# Patient Record
Sex: FEMALE | Race: WHITE | NOT HISPANIC OR LATINO | Employment: STUDENT | URBAN - METROPOLITAN AREA
[De-identification: names, ages, dates, MRNs, and addresses within clinical notes are randomized per-mention and may not be internally consistent; named-entity substitution may affect disease eponyms.]

---
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
## 2022-01-12 DIAGNOSIS — N92.0 MENORRHAGIA WITH REGULAR CYCLE: ICD-10-CM

## 2022-01-12 DIAGNOSIS — N94.6 DYSMENORRHEA: ICD-10-CM

## 2022-01-12 RX ORDER — NORETHINDRONE ACETATE/ETHINYL ESTRADIOL AND FERROUS FUMARATE 1MG-20(24)
1 KIT ORAL DAILY
Qty: 28 TABLET | Refills: 1 | Status: SHIPPED | OUTPATIENT
Start: 2022-01-12 | End: 2022-02-28 | Stop reason: SDUPTHER

## 2022-02-28 ENCOUNTER — ANNUAL EXAM (OUTPATIENT)
Dept: OBGYN CLINIC | Facility: CLINIC | Age: 22
End: 2022-02-28
Payer: COMMERCIAL

## 2022-02-28 VITALS — BODY MASS INDEX: 26.13 KG/M2 | WEIGHT: 157 LBS

## 2022-02-28 DIAGNOSIS — N92.0 MENORRHAGIA WITH REGULAR CYCLE: ICD-10-CM

## 2022-02-28 DIAGNOSIS — R10.2 PELVIC PAIN IN FEMALE: ICD-10-CM

## 2022-02-28 DIAGNOSIS — Z01.419 ENCNTR FOR GYN EXAM (GENERAL) (ROUTINE) W/O ABN FINDINGS: Primary | ICD-10-CM

## 2022-02-28 DIAGNOSIS — N94.6 DYSMENORRHEA: ICD-10-CM

## 2022-02-28 PROCEDURE — 99395 PREV VISIT EST AGE 18-39: CPT | Performed by: NURSE PRACTITIONER

## 2022-02-28 RX ORDER — OMEPRAZOLE 40 MG/1
40 CAPSULE, DELAYED RELEASE ORAL DAILY
COMMUNITY
Start: 2021-12-21 | End: 2022-03-21

## 2022-02-28 RX ORDER — NORETHINDRONE ACETATE/ETHINYL ESTRADIOL AND FERROUS FUMARATE 1MG-20(24)
1 KIT ORAL DAILY
Qty: 84 TABLET | Refills: 3 | Status: SHIPPED | OUTPATIENT
Start: 2022-02-28

## 2022-02-28 NOTE — PROGRESS NOTES
Assessment/Plan   Diagnoses and all orders for this visit:    Encntr for gyn exam (general) (routine) w/o abn findings    Menorrhagia with regular cycle  -     norethindrone-ethinyl estradiol-ferrous fumarate (Reece 24 FE) 1-20 MG-MCG(24) per tablet; Take 1 tablet by mouth daily    Pelvic pain in female  -     US pelvis transabdominal only; Future    Dysmenorrhea  -     norethindrone-ethinyl estradiol-ferrous fumarate (Reece 24 FE) 1-20 MG-MCG(24) per tablet; Take 1 tablet by mouth daily    Other orders  -     omeprazole (PriLOSEC) 40 MG capsule; Take 40 mg by mouth daily        Discussion    Reviewed with patient normal exam today  Pelvic exam deferred  Recommend pt monitor when pain is occurring in association with menses  Recommend charting on a calendar as well as noting when and where its occurring to try and determine causation if any  Can take Ibuprofen 600mg Q 6 hours as needed for pain  If continues or worens can get pelvic ultrasound (transabdominal) due to virginal    Discussed continuous OCP to see if will help with discomfort  Reviewed monthly SBEs  Encourage safe sexual practices; STD testing done today  Contraception - OCP Reece 24 Fe  Pap smears will start at age 26 per the ASCCP guidelines  All questions have been answered to her satisfaction  RTO for annul or sooner if needed    Subjective     Leonela Amaya is a 25 y o  female who presents for annual well woman exam    Last exam 11/25/2020  Pap guidelines reviewed with patient  Pap deferred today  Pt denies any abnormal vaginal discharge, itching, or odor  Pt virginal and denies the need for STD testing today    Menstrual Cycle:  LMP: 2/14/2022  Period Cycle (Days): 28  Period Duration (Days): 5  Period Pattern: Regular  Menstrual Flow: Moderate,Heavy  Menstrual Control: Tampon  Menstrual Control Change Freq (Hours): 6  Dysmenorrhea: (!) Mild  Dysmenorrhea Symptoms: Cramping  Pt complaining of cramping from her clitoris to her anus, feels like the whole muscle cramps  When bad they will be from lower pelvic region  Unable to note a pattern  Originally was only during her menses, now happening more often  When they occur will last a minute or so  Feels like everything is tightening and twisting  Still occur with menses  7/10 pain  She will stay still for a minute and the pain will go away  Usually occur when she is using the restroom  When having a bowel movement  Will occur about 3-4 x a month  Sometimes will occur frequently when menses is heavier  OB History     G 0   Contraception: OCP Reece Fe working well would like to continue  Practices monthly SBEs, no breast complaints today  Denies any bowel or bladder issues  Pt follows with PCP for regular check-ups and blood work  Did have Gardasil vaccine series  Review of Systems   All other systems reviewed and are negative  The following portions of the patient's history were reviewed and updated as appropriate: allergies, current medications, past family history, past medical history, past social history, past surgical history and problem list     History reviewed  No pertinent past medical history  History reviewed  No pertinent surgical history  History reviewed  No pertinent family history      Social History     Socioeconomic History    Marital status: Single     Spouse name: Not on file    Number of children: Not on file    Years of education: Not on file    Highest education level: Not on file   Occupational History    Not on file   Tobacco Use    Smoking status: Never Smoker    Smokeless tobacco: Never Used   Vaping Use    Vaping Use: Never used   Substance and Sexual Activity    Alcohol use: Never    Drug use: Never    Sexual activity: Never   Other Topics Concern    Not on file   Social History Narrative    Not on file     Social Determinants of Health     Financial Resource Strain: Not on file   Food Insecurity: Not on file Transportation Needs: Not on file   Physical Activity: Not on file   Stress: Not on file   Social Connections: Not on file   Intimate Partner Violence: Not on file   Housing Stability: Not on file         Current Outpatient Medications:     erythromycin base 250 mg tablet, , Disp: , Rfl:     norethindrone-ethinyl estradiol-ferrous fumarate (Reece 24 FE) 1-20 MG-MCG(24) per tablet, Take 1 tablet by mouth daily, Disp: 84 tablet, Rfl: 3    omeprazole (PriLOSEC) 40 MG capsule, Take 40 mg by mouth daily, Disp: , Rfl:     Adapalene 0 3 % gel, , Disp: , Rfl:     Cholecalciferol 2000 units CAPS, Take 2,000 Units by mouth daily  (Patient not taking: Reported on 2/28/2022 ), Disp: , Rfl:     dicyclomine (BENTYL) 10 mg capsule, Take 10 mg by mouth 4 (four) times a day (before meals and at bedtime) (Patient not taking: Reported on 2/28/2022 ), Disp: , Rfl:     Allergies   Allergen Reactions    Apple - Food Allergy Swelling    Cats Claw (Uncaria Tomentosa) Eye Swelling    Other      Chemical Suncreens    Avobenzone Rash    Oxybenzone Rash       Objective   Vitals:    02/28/22 1013   Weight: 71 2 kg (157 lb)     Physical Exam  Vitals and nursing note reviewed  Constitutional:       Appearance: She is well-developed  HENT:      Head: Normocephalic  Neck:      Thyroid: No thyromegaly  Trachea: No tracheal deviation  Cardiovascular:      Rate and Rhythm: Normal rate and regular rhythm  Heart sounds: Normal heart sounds  Pulmonary:      Effort: Pulmonary effort is normal       Breath sounds: Normal breath sounds  Chest:   Breasts: Breasts are symmetrical       Right: No inverted nipple, mass, nipple discharge, skin change or tenderness  Left: No inverted nipple, mass, nipple discharge, skin change or tenderness  Abdominal:      General: Bowel sounds are normal  There is no distension  Palpations: Abdomen is soft  There is no mass  Tenderness: There is no abdominal tenderness  There is no guarding or rebound  Musculoskeletal:         General: Normal range of motion  Cervical back: Normal range of motion and neck supple  Skin:     General: Skin is warm and dry  Neurological:      Mental Status: She is alert and oriented to person, place, and time  Psychiatric:         Behavior: Behavior normal          Thought Content:  Thought content normal          Judgment: Judgment normal

## 2022-03-17 ENCOUNTER — HOSPITAL ENCOUNTER (OUTPATIENT)
Dept: RADIOLOGY | Facility: HOSPITAL | Age: 22
Discharge: HOME | End: 2022-03-17
Payer: COMMERCIAL

## 2022-03-17 DIAGNOSIS — R10.2 PELVIC PAIN IN FEMALE: ICD-10-CM

## 2022-03-17 PROCEDURE — 76856 US EXAM PELVIC COMPLETE: CPT

## 2022-04-15 ENCOUNTER — RA CDI HCC (OUTPATIENT)
Dept: OTHER | Facility: HOSPITAL | Age: 22
End: 2022-04-15

## 2022-04-15 NOTE — PROGRESS NOTES
[facility]  coding opportunities       Chart reviewed, no opportunity found: CHART REVIEWED, NO OPPORTUNITY FOUND        Patients Insurance        Commercial Insurance: Shelton Saha

## 2022-04-18 NOTE — PROGRESS NOTES
FAMILY PRACTICE HEALTH MAINTENANCE OFFICE VISIT  [facility]    NAME: Mee Cassidy  AGE: 25 y o  SEX: female  : 2000     DATE: 2022    Assessment and Plan     There are no diagnoses linked to this encounter  Patient Counseling:   {LK Adult CPE Counseling MVVZ:05950::"KUPTZTUIN: Stressed importance of a well balanced diet, moderation of sodium/saturated fat, caloric balance and sufficient intake of fiber","Exercise: Stressed the importance of regular exercise with a goal of 150 minutes per week","Dental Health: Discussed daily flossing and brushing and regular dental visits "}    Immunizations reviewed: { immunization CPE list:70504}  Discussed benefits of:  {LK Adult CPE Screening counselin}  BMI Counseling: There is no height or weight on file to calculate BMI  Discussed with patient's BMI with her  The BMI {VB BMI Counselin}    No follow-ups on file  Chief Complaint   No chief complaint on file  History of Present Illness     HPI    Well Adult Physical   Patient here for a comprehensive physical exam       Diet and Physical Activity  Diet: {diet; well adult:47258}  Exercise: {exericse; well adult:23717}      Depression Screen  PHQ-2/9 Depression Screening            General Health  Hearing: {WELL ADULT BANYWSE:44488}  Vision: {vision; well adult:56023}  Dental: {dental; well adult:14405}    Reproductive Health  { Adult CPE Screening counselin}      The following portions of the patient's history were reviewed and updated as appropriate: allergies, current medications, past family history, past medical history, past social history, past surgical history and problem list     Review of Systems     Review of Systems   Constitutional: Negative  HENT: Negative  Eyes: Negative  Respiratory: Negative  Cardiovascular: Negative  Gastrointestinal: Negative  Endocrine: Negative  Genitourinary: Negative  Musculoskeletal: Negative  Skin: Negative  Allergic/Immunologic: Negative  Neurological: Negative  Hematological: Negative  Psychiatric/Behavioral: Negative  Past Medical History     No past medical history on file  Past Surgical History     No past surgical history on file  Social History     Social History     Socioeconomic History    Marital status: Single     Spouse name: Not on file    Number of children: Not on file    Years of education: Not on file    Highest education level: Not on file   Occupational History    Not on file   Tobacco Use    Smoking status: Never Smoker    Smokeless tobacco: Never Used   Vaping Use    Vaping Use: Never used   Substance and Sexual Activity    Alcohol use: Never    Drug use: Never    Sexual activity: Never   Other Topics Concern    Not on file   Social History Narrative    Not on file     Social Determinants of Health     Financial Resource Strain: Not on file   Food Insecurity: Not on file   Transportation Needs: Not on file   Physical Activity: Not on file   Stress: Not on file   Social Connections: Not on file   Intimate Partner Violence: Not on file   Housing Stability: Not on file       Family History     No family history on file      Current Medications       Current Outpatient Medications:     Adapalene 0 3 % gel, , Disp: , Rfl:     Cholecalciferol 2000 units CAPS, Take 2,000 Units by mouth daily  (Patient not taking: Reported on 2/28/2022 ), Disp: , Rfl:     dicyclomine (BENTYL) 10 mg capsule, Take 10 mg by mouth 4 (four) times a day (before meals and at bedtime) (Patient not taking: Reported on 2/28/2022 ), Disp: , Rfl:     erythromycin base 250 mg tablet, , Disp: , Rfl:     norethindrone-ethinyl estradiol-ferrous fumarate (Reece 24 FE) 1-20 MG-MCG(24) per tablet, Take 1 tablet by mouth daily, Disp: 84 tablet, Rfl: 3     Allergies     Allergies   Allergen Reactions    Apple - Food Allergy Swelling    Cats Claw (Uncaria Tomentosa) Eye Swelling    Other      Chemical Suncreens    Avobenzone Rash    Oxybenzone Rash       Objective     There were no vitals taken for this visit       Physical Exam      No exam data present        MD ELROY [facility]

## 2022-04-25 ENCOUNTER — OFFICE VISIT (OUTPATIENT)
Dept: FAMILY MEDICINE CLINIC | Facility: CLINIC | Age: 22
End: 2022-04-25
Payer: COMMERCIAL

## 2022-04-25 VITALS
TEMPERATURE: 97.5 F | DIASTOLIC BLOOD PRESSURE: 60 MMHG | BODY MASS INDEX: 26.82 KG/M2 | SYSTOLIC BLOOD PRESSURE: 110 MMHG | RESPIRATION RATE: 16 BRPM | HEART RATE: 88 BPM | WEIGHT: 161 LBS | OXYGEN SATURATION: 96 % | HEIGHT: 65 IN

## 2022-04-25 DIAGNOSIS — G43.709 CHRONIC MIGRAINE WITHOUT AURA WITHOUT STATUS MIGRAINOSUS, NOT INTRACTABLE: Primary | ICD-10-CM

## 2022-04-25 DIAGNOSIS — K58.2 IRRITABLE BOWEL SYNDROME WITH BOTH CONSTIPATION AND DIARRHEA: ICD-10-CM

## 2022-04-25 PROCEDURE — 3008F BODY MASS INDEX DOCD: CPT | Performed by: INTERNAL MEDICINE

## 2022-04-25 PROCEDURE — 1036F TOBACCO NON-USER: CPT | Performed by: INTERNAL MEDICINE

## 2022-04-25 PROCEDURE — 99213 OFFICE O/P EST LOW 20 MIN: CPT | Performed by: INTERNAL MEDICINE

## 2022-04-25 RX ORDER — DICYCLOMINE HYDROCHLORIDE 10 MG/1
10 CAPSULE ORAL
Qty: 60 CAPSULE | Refills: 1 | Status: SHIPPED | OUTPATIENT
Start: 2022-04-25

## 2022-04-25 RX ORDER — OMEPRAZOLE 40 MG/1
40 CAPSULE, DELAYED RELEASE ORAL
COMMUNITY
Start: 2022-03-28

## 2022-04-25 RX ORDER — RIZATRIPTAN BENZOATE 10 MG/1
10 TABLET, ORALLY DISINTEGRATING ORAL ONCE AS NEEDED
Qty: 9 TABLET | Refills: 3 | Status: SHIPPED | OUTPATIENT
Start: 2022-04-25

## 2022-04-25 NOTE — ASSESSMENT & PLAN NOTE
We discussed how OCP's may be contributing, although she has no aura and is on a low dose OCP  If things worsen or she gets an aura she may have to stop OCP's  She will keep a migraine diary and will try maxalt PRN; does not have frequent enough migraines to want a prophylactic medication  Asked patient to call sooner than next scheduled appointment for any complaints or issues

## 2022-04-25 NOTE — PROGRESS NOTES
Assessment/Plan:    1  Chronic migraine without aura without status migrainosus, not intractable  Assessment & Plan:  We discussed how OCP's may be contributing, although she has no aura and is on a low dose OCP  If things worsen or she gets an aura she may have to stop OCP's  She will keep a migraine diary and will try maxalt PRN; does not have frequent enough migraines to want a prophylactic medication  Asked patient to call sooner than next scheduled appointment for any complaints or issues  Orders:  -     rizatriptan (Maxalt-MLT) 10 MG disintegrating tablet; Take 1 tablet (10 mg total) by mouth once as needed for migraine for up to 1 dose May repeat in 2 hours if needed    2  Irritable bowel syndrome with both constipation and diarrhea  -     dicyclomine (BENTYL) 10 mg capsule; Take 1 capsule (10 mg total) by mouth 4 (four) times a day (before meals and at bedtime)            There are no Patient Instructions on file for this visit  Return if symptoms worsen or fail to improve  Subjective:      Patient ID: Antonia Fraser is a 25 y o  female  Chief Complaint   Patient presents with    Migraine     started 1 year ago progressivly getting worse-wmcma       She started with migraines about a year ago  She had one in March, then one every month over the winter months, then one in February  Not associated with her period  Has been on OCP's for about 4 years or more  Does have light sensitivity and nausea  No sound sensitivity  Advil or tylenol do not give relief  Dim lights and cooler room will help  There are no other neurologic symptoms  The following portions of the patient's history were reviewed and updated as appropriate: allergies, current medications, past family history, past medical history, past social history, past surgical history and problem list     Review of Systems   Constitutional: Negative  Respiratory: Negative  Cardiovascular: Negative      Neurological: Positive for headaches  Current Outpatient Medications   Medication Sig Dispense Refill    dicyclomine (BENTYL) 10 mg capsule Take 1 capsule (10 mg total) by mouth 4 (four) times a day (before meals and at bedtime) 60 capsule 1    erythromycin base 250 mg tablet       norethindrone-ethinyl estradiol-ferrous fumarate (Reece 24 FE) 1-20 MG-MCG(24) per tablet Take 1 tablet by mouth daily 84 tablet 3    omeprazole (PriLOSEC) 40 MG capsule Take 40 mg by mouth daily in the early morning      rizatriptan (Maxalt-MLT) 10 MG disintegrating tablet Take 1 tablet (10 mg total) by mouth once as needed for migraine for up to 1 dose May repeat in 2 hours if needed 9 tablet 3     No current facility-administered medications for this visit  Objective:    /60   Pulse 88   Temp 97 5 °F (36 4 °C)   Resp 16   Ht 5' 5" (1 651 m)   Wt 73 kg (161 lb)   SpO2 96%   BMI 26 79 kg/m²        Physical Exam  Constitutional:       Appearance: She is well-developed  Eyes:      Conjunctiva/sclera: Conjunctivae normal    Neck:      Thyroid: No thyromegaly  Vascular: No JVD  Cardiovascular:      Rate and Rhythm: Normal rate and regular rhythm  Heart sounds: Normal heart sounds  No murmur heard  No friction rub  No gallop  Pulmonary:      Effort: Pulmonary effort is normal       Breath sounds: Normal breath sounds  No wheezing or rales  Abdominal:      General: Bowel sounds are normal  There is no distension  Palpations: Abdomen is soft  Tenderness: There is no abdominal tenderness  Musculoskeletal:      Cervical back: Neck supple  Neurological:      General: No focal deficit present  Mental Status: She is oriented to person, place, and time  Cranial Nerves: No cranial nerve deficit  Motor: No weakness        Coordination: Coordination normal       Gait: Gait normal       Deep Tendon Reflexes: Reflexes normal                 Eduardo Solis MD

## 2022-11-11 ENCOUNTER — NEW PATIENT (OUTPATIENT)
Dept: URBAN - METROPOLITAN AREA CLINIC 6 | Facility: CLINIC | Age: 22
End: 2022-11-11

## 2022-11-11 DIAGNOSIS — G43.109: ICD-10-CM

## 2022-11-11 DIAGNOSIS — Z83.511: ICD-10-CM

## 2022-11-11 PROCEDURE — 92004 COMPRE OPH EXAM NEW PT 1/>: CPT

## 2022-11-11 ASSESSMENT — TONOMETRY
OD_IOP_MMHG: 16
OS_IOP_MMHG: 14

## 2022-11-11 ASSESSMENT — VISUAL ACUITY
OD_CC: 20/25
OS_CC: 20/30

## 2022-12-21 ENCOUNTER — CONSULT (OUTPATIENT)
Dept: GASTROENTEROLOGY | Facility: AMBULARY SURGERY CENTER | Age: 22
End: 2022-12-21

## 2022-12-21 VITALS
BODY MASS INDEX: 25.33 KG/M2 | DIASTOLIC BLOOD PRESSURE: 62 MMHG | SYSTOLIC BLOOD PRESSURE: 112 MMHG | OXYGEN SATURATION: 99 % | HEART RATE: 93 BPM | WEIGHT: 152 LBS | HEIGHT: 65 IN

## 2022-12-21 DIAGNOSIS — K59.09 OTHER CONSTIPATION: ICD-10-CM

## 2022-12-21 DIAGNOSIS — K31.84 GASTROPARESIS: ICD-10-CM

## 2022-12-21 DIAGNOSIS — K21.9 GASTROESOPHAGEAL REFLUX DISEASE WITHOUT ESOPHAGITIS: Primary | ICD-10-CM

## 2022-12-21 RX ORDER — FAMOTIDINE 20 MG/1
20 TABLET, FILM COATED ORAL 2 TIMES DAILY
Qty: 60 TABLET | Refills: 11 | Status: SHIPPED | OUTPATIENT
Start: 2022-12-21

## 2022-12-21 RX ORDER — OMEPRAZOLE 40 MG/1
40 CAPSULE, DELAYED RELEASE ORAL
Qty: 30 CAPSULE | Refills: 11 | Status: SHIPPED | OUTPATIENT
Start: 2022-12-21

## 2022-12-21 RX ORDER — ERYTHROMYCIN 250 MG/1
250 TABLET, COATED ORAL 2 TIMES DAILY
Qty: 60 TABLET | Refills: 3 | Status: SHIPPED | OUTPATIENT
Start: 2022-12-21 | End: 2023-01-20

## 2022-12-21 NOTE — ASSESSMENT & PLAN NOTE
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

## 2022-12-21 NOTE — ASSESSMENT & PLAN NOTE
Patient with history of gastroparesis and has been on erythromycin 250 mg twice daily since 2016  She was evaluated by Dr Steff Rene at [facility] and was recommended for G-POEM    -had a long discussion with patient about the medications Reglan and domperidone but concerned about the side effects     -I would like to check the gastric emptying study prior to changing her to the new medication  Advised her to stop the erythromycin for couple of weeks and get the gastric emptying study    -advised about small frequent low-fat meal    -Follow up with Dr Steff Rene for G-POEM    -follow-up office visit after gastric emptying study  -we can also consider Kaylah Gear since she also reports having issues with constipation

## 2022-12-21 NOTE — PROGRESS NOTES
Consultation - [facility]  Ezequiel Vargas 2000 female         Chief Complaint:  History of gastroparesis    HPI:  28-year-old female with history of history bases, GERD came in since she is trying to transfer him from ileitis to adult GI  She was recently followed by GI in it [address] [place] [address] and had EGD with FLIP pyloric dilation by Dr Lindsey Covarrubias  She was advised for G-POEM but she is not sure if she wants to go through that  She has been on erythromycin 250 mg twice daily since 2016 when she was diagnosed with gastroparesis  Complaining about symptoms when she tries to stop the medication  She also takes omeprazole 40 mg daily  She reports having problems with some constipation  Denies any blood or mucus in the stool  Good appetite, no recent weight loss  Medical Resident: Dr Valentina Villafuerte: Review of Systems   Constitutional: Negative for activity change, appetite change, chills, diaphoresis, fatigue, fever and unexpected weight change  HENT: Negative for ear discharge, ear pain, facial swelling, hearing loss, nosebleeds, sore throat, tinnitus and voice change  Eyes: Negative for pain, discharge, redness, itching and visual disturbance  Respiratory: Negative for apnea, cough, chest tightness, shortness of breath and wheezing  Cardiovascular: Negative for chest pain and palpitations  Gastrointestinal:        As noted in HPI   Endocrine: Negative for cold intolerance, heat intolerance and polyuria  Genitourinary: Negative for difficulty urinating, dysuria, flank pain, hematuria and urgency  Musculoskeletal: Negative for arthralgias, back pain, gait problem, joint swelling and myalgias  Skin: Negative for rash and wound  Neurological: Negative for dizziness, tremors, seizures, speech difficulty, light-headedness, numbness and headaches  Hematological: Negative for adenopathy  Does not bruise/bleed easily  Psychiatric/Behavioral: Negative for agitation, behavioral problems and confusion  The patient is not nervous/anxious  No past medical history on file  No past surgical history on file  Social History     Socioeconomic History   • Marital status: Single     Spouse name: Not on file   • Number of children: Not on file   • Years of education: Not on file   • Highest education level: Not on file   Occupational History   • Not on file   Tobacco Use   • Smoking status: Never   • Smokeless tobacco: Never   Vaping Use   • Vaping Use: Never used   Substance and Sexual Activity   • Alcohol use: Never   • Drug use: Never   • Sexual activity: Never   Other Topics Concern   • Not on file   Social History Narrative   • Not on file     Social Determinants of Health     Financial Resource Strain: Not on file   Food Insecurity: Not on file   Transportation Needs: Not on file   Physical Activity: Not on file   Stress: Not on file   Social Connections: Not on file   Intimate Partner Violence: Not on file   Housing Stability: Not on file     No family history on file    Apple - food allergy, Cats claw (uncaria tomentosa), Other, Avobenzone, and Oxybenzone  Current Outpatient Medications   Medication Sig Dispense Refill   • dicyclomine (BENTYL) 10 mg capsule Take 1 capsule (10 mg total) by mouth 4 (four) times a day (before meals and at bedtime) 60 capsule 1   • erythromycin base 250 mg tablet Take 1 tablet (250 mg total) by mouth 2 (two) times a day 60 tablet 3   • famotidine (PEPCID) 20 mg tablet Take 1 tablet (20 mg total) by mouth 2 (two) times a day 60 tablet 11   • omeprazole (PriLOSEC) 40 MG capsule Take 1 capsule (40 mg total) by mouth daily in the early morning 30 capsule 11   • rizatriptan (Maxalt-MLT) 10 MG disintegrating tablet Take 1 tablet (10 mg total) by mouth once as needed for migraine for up to 1 dose May repeat in 2 hours if needed 9 tablet 3   • norethindrone-ethinyl estradiol-ferrous fumarate (Reece 24 FE) 1-20 MG-MCG(24) per tablet Take 1 tablet by mouth daily (Patient not taking: Reported on 12/21/2022) 84 tablet 3   • omeprazole (PriLOSEC) 40 MG capsule Take 40 mg by mouth daily       No current facility-administered medications for this visit  Blood pressure 112/62, pulse 93, height 5' 5" (1 651 m), weight 68 9 kg (152 lb), SpO2 99 %, not currently breastfeeding  PHYSICAL EXAM: Physical Exam  Constitutional:       Appearance: Normal appearance  She is well-developed  HENT:      Head: Normocephalic and atraumatic  Nose: Nose normal    Eyes:      Conjunctiva/sclera: Conjunctivae normal    Neck:      Thyroid: No thyromegaly  Vascular: No JVD  Trachea: No tracheal deviation  Cardiovascular:      Rate and Rhythm: Normal rate and regular rhythm  Heart sounds: Normal heart sounds  No murmur heard  No friction rub  No gallop  Pulmonary:      Effort: Pulmonary effort is normal  No respiratory distress  Breath sounds: Normal breath sounds  No wheezing or rales  Abdominal:      General: Bowel sounds are normal  There is no distension  Palpations: Abdomen is soft  There is no mass  Tenderness: There is no abdominal tenderness  There is no guarding  Hernia: No hernia is present  Musculoskeletal:         General: No tenderness or deformity  Cervical back: Neck supple  Right lower leg: No edema  Left lower leg: No edema  Lymphadenopathy:      Cervical: No cervical adenopathy  Skin:     General: Skin is warm and dry  Findings: No erythema or rash  Neurological:      Mental Status: She is alert and oriented to person, place, and time  Psychiatric:         Mood and Affect: Mood normal          Behavior: Behavior normal          Thought Content:  Thought content normal           Lab Results   Component Value Date    WBC 6 0 09/01/2018    HGB 13 8 09/01/2018    HCT 40 7 09/01/2018    MCV 90 2 09/01/2018     09/01/2018     No results found for: GLUCOSE, CALCIUM, NA, K, CO2, CL, BUN, CREATININE  No results found for: ALT, AST, GGT, ALKPHOS, BILITOT  No results found for: INR, PROTIME    US pelvis transabdominal only    Result Date: 3/18/2022  Impression:  Normal   Workstation performed: [id number]       ASSESSMENT & PLAN:    Gastroparesis  Patient with history of gastroparesis and has been on erythromycin 250 mg twice daily since 2016  She was evaluated by Dr Ly Palencia at [facility] and was recommended for G-POEM    -had a long discussion with patient about the medications Reglan and domperidone but concerned about the side effects     -I would like to check the gastric emptying study prior to changing her to the new medication  Advised her to stop the erythromycin for couple of weeks and get the gastric emptying study    -advised about small frequent low-fat meal    -Follow up with Dr Ly Palencia for G-POEM    -follow-up office visit after gastric emptying study  -we can also consider Jenelle Pineda since she also reports having issues with constipation  Gastroesophageal reflux disease without esophagitis  Patient with history of chronic GERD and just had upper endoscopy recently  No evidence of any Delgado's esophagus     -Patient was explained about the lifestyle and dietary modifications  Advised to avoid fatty foods, chocolates, caffeine, alcohol and any other triggering foods  Avoid eating for at least 3 hours before going to bed         -discussed about the long-term side effects from omeprazole and advised her to try Pepcid instead  Prescription was also sent for Pepcid      Other constipation  Patient reports having more issues with constipation which appear to be secondary to 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

## 2022-12-21 NOTE — ASSESSMENT & PLAN NOTE
Patient with history of chronic GERD and just had upper endoscopy recently  No evidence of any Delgado's esophagus     -Patient was explained about the lifestyle and dietary modifications  Advised to avoid fatty foods, chocolates, caffeine, alcohol and any other triggering foods  Avoid eating for at least 3 hours before going to bed         -discussed about the long-term side effects from omeprazole and advised her to try Pepcid instead  Prescription was also sent for Pepcid

## 2023-01-03 ENCOUNTER — HOSPITAL ENCOUNTER (OUTPATIENT)
Dept: RADIOLOGY | Facility: HOSPITAL | Age: 23
Discharge: HOME | End: 2023-01-03
Attending: INTERNAL MEDICINE

## 2023-01-03 DIAGNOSIS — K31.84 GASTROPARESIS: ICD-10-CM

## 2023-03-08 ENCOUNTER — RA CDI HCC (OUTPATIENT)
Dept: OTHER | Facility: HOSPITAL | Age: 23
End: 2023-03-08

## 2023-03-08 NOTE — PROGRESS NOTES
[facility]  coding opportunities       Chart reviewed, no opportunity found: CHART REVIEWED, NO OPPORTUNITY FOUND        Patients Insurance        Commercial Insurance: Shelton Saha

## 2023-03-15 ENCOUNTER — OFFICE VISIT (OUTPATIENT)
Dept: FAMILY MEDICINE CLINIC | Facility: CLINIC | Age: 23
End: 2023-03-15

## 2023-03-15 VITALS
WEIGHT: 148 LBS | DIASTOLIC BLOOD PRESSURE: 60 MMHG | BODY MASS INDEX: 24.66 KG/M2 | RESPIRATION RATE: 14 BRPM | OXYGEN SATURATION: 100 % | SYSTOLIC BLOOD PRESSURE: 102 MMHG | TEMPERATURE: 98 F | HEIGHT: 65 IN | HEART RATE: 96 BPM

## 2023-03-15 DIAGNOSIS — Z00.00 WELL ADULT EXAM: Primary | ICD-10-CM

## 2023-03-15 DIAGNOSIS — R79.89 LOW VITAMIN D LEVEL: Primary | ICD-10-CM

## 2023-03-15 DIAGNOSIS — R53.83 OTHER FATIGUE: ICD-10-CM

## 2023-03-15 DIAGNOSIS — R63.4 WEIGHT LOSS: ICD-10-CM

## 2023-03-15 DIAGNOSIS — Z13.6 SCREENING FOR CARDIOVASCULAR CONDITION: ICD-10-CM

## 2023-03-15 DIAGNOSIS — Z11.59 ENCOUNTER FOR HEPATITIS C SCREENING TEST FOR LOW RISK PATIENT: ICD-10-CM

## 2023-03-15 NOTE — PROGRESS NOTES
FAMILY PRACTICE HEALTH MAINTENANCE OFFICE VISIT  [facility]    NAME: Nany Cassidy  AGE: 23 y o  SEX: female  : 2000     DATE: 3/15/2023    Assessment and Plan     1  Well adult exam    2  Screening for cardiovascular condition  -     CBC and differential; Future  -     Comprehensive metabolic panel; Future  -     Lipid panel; Future  -     CBC and differential  -     Comprehensive metabolic panel  -     Lipid panel    3  Encounter for hepatitis C screening test for low risk patient  -     Hepatitis C antibody; Future  -     Hepatitis C antibody    4  Other fatigue  -     CBC and differential; Future  -     TSH, 3rd generation with Free T4 reflex; Future  -     CBC and differential  -     TSH, 3rd generation with Free T4 reflex    5  Weight loss  -     TSH, 3rd generation with Free T4 reflex; Future  -     TSH, 3rd generation with Free T4 reflex      Patient Counseling:   Nutrition: Stressed importance of a well balanced diet, moderation of sodium/saturated fat, caloric balance and sufficient intake of fiber  Exercise: Stressed the importance of regular exercise with a goal of 150 minutes per week  Dental Health: Discussed daily flossing and brushing and regular dental visits     Immunizations reviewed: Up To Date and Risks and Benefits discussed  Discussed benefits of:  Screening labs  BMI Counseling: Body mass index is 24 63 kg/m²  Discussed with patient's BMI with her  The BMI is normal     No follow-ups on file  Chief Complaint     Chief Complaint   Patient presents with   • Physical Exam     Jamshid Kaba CMA    • Abdominal Pain     Stopped taking erythromycin due to cardiac symptoms  Stomach pain is now back  Jamshid Kaba CMA        History of Present Illness     Here for CPE  She has not been feeling hungry at all and has lost about 12 pounds  She stopped taking her motility medications and has not been back to see her GI    She will make an appointment  Has also been feeling a bit more fatigued than usual       Well Adult Physical   Patient here for a comprehensive physical exam       Diet and Physical Activity  Diet: well balanced diet  Exercise: frequently      Depression Screen  PHQ-2/9 Depression Screening    Little interest or pleasure in doing things: 1 - several days  Feeling down, depressed, or hopeless: 0 - not at all  PHQ-2 Score: 1  PHQ-2 Interpretation: Negative depression screen          General Health  Hearing: Normal:  bilateral  Vision: no vision problems, most recent eye exam <1 year and wears glasses  Dental: regular dental visits    Reproductive Health  No issues       The following portions of the patient's history were reviewed and updated as appropriate: allergies, current medications, past family history, past medical history, past social history, past surgical history and problem list     Review of Systems     Review of Systems   Constitutional: Positive for fatigue and unexpected weight change  HENT: Negative  Eyes: Negative  Respiratory: Negative  Cardiovascular: Negative  Gastrointestinal: Negative  Endocrine: Negative  Genitourinary: Negative  Musculoskeletal: Negative  Skin: Negative  Allergic/Immunologic: Negative  Neurological: Negative  Hematological: Negative  Psychiatric/Behavioral: Negative  Past Medical History     No past medical history on file  Past Surgical History     No past surgical history on file      Social History     Social History     Socioeconomic History   • Marital status: Single     Spouse name: None   • Number of children: None   • Years of education: None   • Highest education level: None   Occupational History   • None   Tobacco Use   • Smoking status: Never   • Smokeless tobacco: Never   Vaping Use   • Vaping Use: Never used   Substance and Sexual Activity   • Alcohol use: Never   • Drug use: Never   • Sexual activity: Never   Other Topics Concern • None   Social History Narrative   • None     Social Determinants of Health     Financial Resource Strain: Not on file   Food Insecurity: Not on file   Transportation Needs: Not on file   Physical Activity: Not on file   Stress: Not on file   Social Connections: Not on file   Intimate Partner Violence: Not on file   Housing Stability: Not on file       Family History     No family history on file  Current Medications       Current Outpatient Medications:   •  dicyclomine (BENTYL) 10 mg capsule, Take 1 capsule (10 mg total) by mouth 4 (four) times a day (before meals and at bedtime), Disp: 60 capsule, Rfl: 1  •  famotidine (PEPCID) 20 mg tablet, Take 1 tablet (20 mg total) by mouth 2 (two) times a day, Disp: 60 tablet, Rfl: 11  •  rizatriptan (Maxalt-MLT) 10 MG disintegrating tablet, Take 1 tablet (10 mg total) by mouth once as needed for migraine for up to 1 dose May repeat in 2 hours if needed, Disp: 9 tablet, Rfl: 3     Allergies     Allergies   Allergen Reactions   • Apple - Food Allergy Swelling   • Cats Claw (Uncaria Tomentosa) Eye Swelling   • Other      Chemical Suncreens   • Avobenzone Rash   • Oxybenzone Rash       Objective     /60 (BP Location: Left arm, Patient Position: Sitting, Cuff Size: Standard)   Pulse 96   Temp 98 °F (36 7 °C)   Resp 14   Ht 5' 5" (1 651 m)   Wt 67 1 kg (148 lb)   LMP 02/16/2023 (Approximate)   SpO2 100%   BMI 24 63 kg/m²      Physical Exam  Constitutional:       General: She is not in acute distress  Appearance: She is well-developed  She is not diaphoretic  HENT:      Head: Normocephalic and atraumatic  Right Ear: External ear normal       Left Ear: External ear normal    Neck:      Thyroid: No thyromegaly  Cardiovascular:      Rate and Rhythm: Normal rate and regular rhythm  Heart sounds: Normal heart sounds  No murmur heard  No friction rub  No gallop     Pulmonary:      Effort: Pulmonary effort is normal       Breath sounds: Normal breath sounds  No wheezing or rales  Abdominal:      General: Bowel sounds are normal       Palpations: Abdomen is soft  There is no mass  Tenderness: There is no abdominal tenderness  There is no rebound  Musculoskeletal:         General: No deformity  Normal range of motion  Cervical back: Normal range of motion and neck supple  Lymphadenopathy:      Cervical: No cervical adenopathy  Skin:     General: Skin is warm and dry  Findings: No rash  Neurological:      Mental Status: She is alert and oriented to person, place, and time  Cranial Nerves: No cranial nerve deficit  Motor: No abnormal muscle tone  Coordination: Coordination normal       Deep Tendon Reflexes: Reflexes are normal and symmetric  Reflexes normal    Psychiatric:         Behavior: Behavior normal          Thought Content:  Thought content normal          Judgment: Judgment normal            Vision Screening    Right eye Left eye Both eyes   Without correction      With correction 20/13 20/13 20/13           Hermann Vasquez, [address]

## 2023-03-17 LAB
25(OH)D3+25(OH)D2 SERPL-MCNC: 24.5 NG/ML (ref 30–100)
ALBUMIN SERPL-MCNC: 4.7 G/DL (ref 3.9–5)
ALBUMIN/GLOB SERPL: 2.2 {RATIO} (ref 1.2–2.2)
ALP SERPL-CCNC: 87 IU/L (ref 42–106)
ALT SERPL-CCNC: 9 IU/L (ref 0–32)
AST SERPL-CCNC: 10 IU/L (ref 0–40)
BASOPHILS # BLD AUTO: 0.1 X10E3/UL (ref 0–0.2)
BASOPHILS NFR BLD AUTO: 1 %
BILIRUB SERPL-MCNC: 0.6 MG/DL (ref 0–1.2)
BUN SERPL-MCNC: 12 MG/DL (ref 6–20)
BUN/CREAT SERPL: 18 (ref 9–23)
CALCIUM SERPL-MCNC: 9.1 MG/DL (ref 8.7–10.2)
CHLORIDE SERPL-SCNC: 106 MMOL/L (ref 96–106)
CHOLEST SERPL-MCNC: 131 MG/DL (ref 100–199)
CHOLEST/HDLC SERPL: 2.5 RATIO (ref 0–4.4)
CO2 SERPL-SCNC: 19 MMOL/L (ref 20–29)
CREAT SERPL-MCNC: 0.66 MG/DL (ref 0.57–1)
EGFR: 129 ML/MIN/1.73
EOSINOPHIL # BLD AUTO: 0.1 X10E3/UL (ref 0–0.4)
EOSINOPHIL NFR BLD AUTO: 1 %
ERYTHROCYTE [DISTWIDTH] IN BLOOD BY AUTOMATED COUNT: 12.7 % (ref 11.7–15.4)
GLOBULIN SER-MCNC: 2.1 G/DL (ref 1.5–4.5)
GLUCOSE SERPL-MCNC: 83 MG/DL (ref 70–99)
HCT VFR BLD AUTO: 37.7 % (ref 34–46.6)
HCV AB S/CO SERPL IA: NON REACTIVE
HDLC SERPL-MCNC: 52 MG/DL
HGB BLD-MCNC: 12.7 G/DL (ref 11.1–15.9)
IMM GRANULOCYTES # BLD: 0 X10E3/UL (ref 0–0.1)
IMM GRANULOCYTES NFR BLD: 0 %
LDLC SERPL CALC-MCNC: 70 MG/DL (ref 0–99)
LYMPHOCYTES # BLD AUTO: 2 X10E3/UL (ref 0.7–3.1)
LYMPHOCYTES NFR BLD AUTO: 36 %
MCH RBC QN AUTO: 30.1 PG (ref 26.6–33)
MCHC RBC AUTO-ENTMCNC: 33.7 G/DL (ref 31.5–35.7)
MCV RBC AUTO: 89 FL (ref 79–97)
MICRODELETION SYND BLD/T FISH: NORMAL
MONOCYTES # BLD AUTO: 0.3 X10E3/UL (ref 0.1–0.9)
MONOCYTES NFR BLD AUTO: 6 %
NEUTROPHILS # BLD AUTO: 3.1 X10E3/UL (ref 1.4–7)
NEUTROPHILS NFR BLD AUTO: 56 %
PLATELET # BLD AUTO: 283 X10E3/UL (ref 150–450)
POTASSIUM SERPL-SCNC: 4.1 MMOL/L (ref 3.5–5.2)
PROT SERPL-MCNC: 6.8 G/DL (ref 6–8.5)
RBC # BLD AUTO: 4.22 X10E6/UL (ref 3.77–5.28)
SL AMB VLDL CHOLESTEROL CALC: 9 MG/DL (ref 5–40)
SODIUM SERPL-SCNC: 139 MMOL/L (ref 134–144)
TRIGL SERPL-MCNC: 36 MG/DL (ref 0–149)
TSH SERPL DL<=0.005 MIU/L-ACNC: 2.28 UIU/ML (ref 0.45–4.5)
WBC # BLD AUTO: 5.6 X10E3/UL (ref 3.4–10.8)

## 2023-05-16 ENCOUNTER — OFFICE VISIT (OUTPATIENT)
Dept: GASTROENTEROLOGY | Facility: CLINIC | Age: 23
End: 2023-05-16

## 2023-05-16 VITALS
HEART RATE: 93 BPM | WEIGHT: 156 LBS | BODY MASS INDEX: 25.99 KG/M2 | SYSTOLIC BLOOD PRESSURE: 115 MMHG | HEIGHT: 65 IN | DIASTOLIC BLOOD PRESSURE: 83 MMHG

## 2023-05-16 DIAGNOSIS — K21.9 GASTROESOPHAGEAL REFLUX DISEASE WITHOUT ESOPHAGITIS: ICD-10-CM

## 2023-05-16 DIAGNOSIS — K58.2 IRRITABLE BOWEL SYNDROME WITH BOTH CONSTIPATION AND DIARRHEA: Primary | ICD-10-CM

## 2023-05-16 DIAGNOSIS — K31.84 GASTROPARESIS: ICD-10-CM

## 2023-05-16 RX ORDER — OMEPRAZOLE 40 MG/1
CAPSULE, DELAYED RELEASE ORAL
COMMUNITY
Start: 2023-05-08

## 2023-05-16 RX ORDER — AMITRIPTYLINE HYDROCHLORIDE 25 MG/1
25 TABLET, FILM COATED ORAL
Qty: 30 TABLET | Refills: 4 | Status: SHIPPED | OUTPATIENT
Start: 2023-05-16

## 2023-05-16 RX ORDER — ERYTHROMYCIN 250 MG/1
TABLET, DELAYED RELEASE ORAL
COMMUNITY

## 2023-05-16 NOTE — ASSESSMENT & PLAN NOTE
-Treatment plan as described above    -Check food allergy profile    -Advised about high-fiber diet and fiber supplements    -Take Bentyl as needed

## 2023-05-16 NOTE — ASSESSMENT & PLAN NOTE
-Patient was explained about the lifestyle and dietary modifications  Advised to avoid fatty foods, chocolates, caffeine, alcohol and any other triggering foods  Avoid eating for at least 3 hours before going to bed     -Continue Prilosec for now  Discussed over the long-term side effects

## 2023-05-16 NOTE — ASSESSMENT & PLAN NOTE
Gastric emptying was normal at 3 and 4 hours even though there was a slight delay in the beginning  Patient reports having mostly issues with food regurgitation and I am wondering that some of the symptoms are functional most likely secondary to IBS  -Explained to patient in detail about the gastric emptying study results    -Discussed in detail about the pathophysiology of IBS  -Advised about small frequent low-fat meal    -Low FODMAP diet    -We will try amitriptyline 25 mg at bedtime half the pill to start with for about a week and then increase to 1 pill a day      -Follow-up office visit in couple of months

## 2023-05-16 NOTE — PROGRESS NOTES
Follow-up Note -  [facility]  Judson Levi 2000 female         Reason: Follow-up    HPI:  Ms Radha Sethi came in for follow-up  She has history of gastroparesis and was followed by pediatric GI in the past and was on erythromycin for a long time  She was also recommended for G-POEM  I sent her for a gastric emptying study which showed some delayed emptying in the first couple of hours but then the emptying was normal at 3 and 4 hours  She stopped using erythromycin for the study and then stopped completely couple of months ago because of palpitations and shortness of breath  She reports having decreased appetite and losing 20 pounds weight but as per our scale she actually gained 4 pounds since December  She complains about food regurgitation after eating and also when she is bending  Patient reports having more problems when she eats the egg  Denies any nausea or vomiting  She reports having problems with cramping pain, both diarrhea and constipation from IBS  She was tested negative for celiac disease in the past     She had episodes of bad acid reflux that was not helped by the Pepcid and started using Prilosec with complete resolution of symptoms  Chaperon: Ms Baldemar Durham: Review of Systems   Constitutional: Negative for activity change, appetite change, chills, diaphoresis, fatigue, fever and unexpected weight change  HENT: Negative for ear discharge, ear pain, facial swelling, hearing loss, nosebleeds, sore throat, tinnitus and voice change  Eyes: Negative for pain, discharge, redness, itching and visual disturbance  Respiratory: Negative for apnea, cough, chest tightness, shortness of breath and wheezing  Cardiovascular: Negative for chest pain and palpitations  Gastrointestinal:        As noted in HPI   Endocrine: Negative for cold intolerance, heat intolerance and polyuria     Genitourinary: Negative for difficulty urinating, dysuria, flank pain, hematuria and urgency  Musculoskeletal: Positive for arthralgias  Negative for back pain, gait problem, joint swelling and myalgias  Skin: Negative for rash and wound  Neurological: Negative for dizziness, tremors, seizures, speech difficulty, light-headedness, numbness and headaches  Hematological: Negative for adenopathy  Does not bruise/bleed easily  Psychiatric/Behavioral: Negative for agitation, behavioral problems and confusion  The patient is not nervous/anxious  Past Medical History:   Diagnosis Date   • GERD (gastroesophageal reflux disease)    • Irritable bowel syndrome       No past surgical history on file    Social History     Socioeconomic History   • Marital status: Single     Spouse name: Not on file   • Number of children: Not on file   • Years of education: Not on file   • Highest education level: Not on file   Occupational History   • Not on file   Tobacco Use   • Smoking status: Never   • Smokeless tobacco: Never   Vaping Use   • Vaping Use: Never used   Substance and Sexual Activity   • Alcohol use: Never   • Drug use: Never   • Sexual activity: Never   Other Topics Concern   • Not on file   Social History Narrative   • Not on file     Social Determinants of Health     Financial Resource Strain: Not on file   Food Insecurity: Not on file   Transportation Needs: Not on file   Physical Activity: Not on file   Stress: Not on file   Social Connections: Not on file   Intimate Partner Violence: Not on file   Housing Stability: Not on file     Family History   Problem Relation Age of Onset   • Arthritis Mother         Rheumatoid Arthritis   • Breast cancer Maternal Grandmother    • Diabetes Maternal Grandmother    • Irritable bowel syndrome Paternal Grandmother      Apple - food allergy, Cats claw (uncaria tomentosa), Other, Avobenzone, and Oxybenzone  Current Outpatient Medications   Medication Sig Dispense Refill   • amitriptyline (ELAVIL) 25 mg tablet Take 1 tablet (25 mg "total) by mouth daily at bedtime 30 tablet 4   • dicyclomine (BENTYL) 10 mg capsule Take 1 capsule (10 mg total) by mouth 4 (four) times a day (before meals and at bedtime) 60 capsule 1   • omeprazole (PriLOSEC) 40 MG capsule      • rizatriptan (Maxalt-MLT) 10 MG disintegrating tablet Take 1 tablet (10 mg total) by mouth once as needed for migraine for up to 1 dose May repeat in 2 hours if needed 9 tablet 3   • Erythromycin 250 MG TBEC  (Patient not taking: Reported on 5/16/2023)     • famotidine (PEPCID) 20 mg tablet Take 1 tablet (20 mg total) by mouth 2 (two) times a day (Patient not taking: Reported on 5/16/2023) 60 tablet 11     No current facility-administered medications for this visit  Blood pressure 115/83, pulse 93, height 5' 5\" (1 651 m), weight 70 8 kg (156 lb), not currently breastfeeding  PHYSICAL EXAM: Physical Exam  Constitutional:       Appearance: Normal appearance  She is well-developed  HENT:      Head: Normocephalic and atraumatic  Nose: Nose normal    Eyes:      Conjunctiva/sclera: Conjunctivae normal    Neck:      Thyroid: No thyromegaly  Vascular: No JVD  Trachea: No tracheal deviation  Cardiovascular:      Rate and Rhythm: Normal rate and regular rhythm  Heart sounds: Normal heart sounds  No murmur heard  No friction rub  No gallop  Pulmonary:      Effort: Pulmonary effort is normal  No respiratory distress  Breath sounds: Normal breath sounds  No wheezing or rales  Abdominal:      General: Bowel sounds are normal  There is no distension  Palpations: Abdomen is soft  There is no mass  Tenderness: There is no abdominal tenderness  There is no guarding  Hernia: No hernia is present  Musculoskeletal:         General: No tenderness or deformity  Cervical back: Neck supple  Right lower leg: No edema  Left lower leg: No edema  Lymphadenopathy:      Cervical: No cervical adenopathy     Skin:     General: Skin is warm and " dry       Findings: No erythema or rash  Neurological:      Mental Status: She is alert and oriented to person, place, and time  Psychiatric:         Mood and Affect: Mood normal          Behavior: Behavior normal          Thought Content: Thought content normal           Lab Results   Component Value Date    WBC 5 6 03/16/2023    HGB 12 7 03/16/2023    HCT 37 7 03/16/2023    MCV 89 03/16/2023     03/16/2023     Lab Results   Component Value Date    K 4 1 03/16/2023    CO2 19 (L) 03/16/2023     03/16/2023    BUN 12 03/16/2023    CREATININE 0 66 03/16/2023     Lab Results   Component Value Date    ALT 9 03/16/2023    AST 10 03/16/2023     No results found for: INR, PROTIME    NM gastric emptying    Result Date: 1/3/2023  Impression: Delayed gastric emptying at 2 hours, otherwise normal rate of gastric emptying  Workstation performed: [id number]       ASSESSMENT & PLAN:    Gastroparesis  Gastric emptying was normal at 3 and 4 hours even though there was a slight delay in the beginning  Patient reports having mostly issues with food regurgitation and I am wondering that some of the symptoms are functional most likely secondary to IBS  -Explained to patient in detail about the gastric emptying study results    -Discussed in detail about the pathophysiology of IBS  -Advised about small frequent low-fat meal    -Low FODMAP diet    -We will try amitriptyline 25 mg at bedtime half the pill to start with for about a week and then increase to 1 pill a day  -Follow-up office visit in couple of months    Gastroesophageal reflux disease without esophagitis    -Patient was explained about the lifestyle and dietary modifications  Advised to avoid fatty foods, chocolates, caffeine, alcohol and any other triggering foods  Avoid eating for at least 3 hours before going to bed     -Continue Prilosec for now  Discussed over the long-term side effects      Irritable bowel syndrome with both constipation and diarrhea    -Treatment plan as described above    -Check food allergy profile    -Advised about high-fiber diet and fiber supplements    -Take Bentyl as needed

## 2023-05-19 DIAGNOSIS — G43.709 CHRONIC MIGRAINE WITHOUT AURA WITHOUT STATUS MIGRAINOSUS, NOT INTRACTABLE: ICD-10-CM

## 2023-05-22 RX ORDER — RIZATRIPTAN BENZOATE 10 MG/1
10 TABLET, ORALLY DISINTEGRATING ORAL ONCE AS NEEDED
Qty: 9 TABLET | Refills: 3 | Status: SHIPPED | OUTPATIENT
Start: 2023-05-22

## 2023-05-24 LAB
CODFISH IGE QN: <0.1 KU/L
COW MILK IGE QN: <0.1 KU/L
EGG WHITE IGE QN: <0.1 KU/L
Lab: NORMAL
PEANUT (RARA H) 6 IGE QN: <0.1 KU/L
SOYBEAN IGE QN: <0.1 KU/L
WHEAT IGE QN: <0.1 KU/L

## 2023-12-18 ENCOUNTER — OFFICE VISIT (OUTPATIENT)
Dept: FAMILY MEDICINE CLINIC | Facility: CLINIC | Age: 23
End: 2023-12-18
Payer: COMMERCIAL

## 2023-12-18 VITALS
DIASTOLIC BLOOD PRESSURE: 64 MMHG | SYSTOLIC BLOOD PRESSURE: 108 MMHG | RESPIRATION RATE: 16 BRPM | TEMPERATURE: 97 F | HEIGHT: 65 IN | WEIGHT: 156.8 LBS | HEART RATE: 80 BPM | BODY MASS INDEX: 26.12 KG/M2

## 2023-12-18 DIAGNOSIS — Z23 NEED FOR VACCINATION: ICD-10-CM

## 2023-12-18 DIAGNOSIS — B37.9 YEAST INFECTION: ICD-10-CM

## 2023-12-18 DIAGNOSIS — R39.89 URINARY PROBLEM: ICD-10-CM

## 2023-12-18 DIAGNOSIS — G43.709 CHRONIC MIGRAINE WITHOUT AURA WITHOUT STATUS MIGRAINOSUS, NOT INTRACTABLE: Primary | ICD-10-CM

## 2023-12-18 PROCEDURE — 99214 OFFICE O/P EST MOD 30 MIN: CPT | Performed by: INTERNAL MEDICINE

## 2023-12-18 PROCEDURE — 90471 IMMUNIZATION ADMIN: CPT

## 2023-12-18 PROCEDURE — 90715 TDAP VACCINE 7 YRS/> IM: CPT

## 2023-12-18 RX ORDER — SUMATRIPTAN 50 MG/1
50 TABLET, FILM COATED ORAL ONCE AS NEEDED
Qty: 10 TABLET | Refills: 5 | Status: SHIPPED | OUTPATIENT
Start: 2023-12-18

## 2023-12-18 RX ORDER — FLUCONAZOLE 150 MG/1
150 TABLET ORAL ONCE
Qty: 2 TABLET | Refills: 0 | Status: SHIPPED | OUTPATIENT
Start: 2023-12-18 | End: 2023-12-18

## 2023-12-18 NOTE — ASSESSMENT & PLAN NOTE
She felt the maxalt made her nauseous and will try sumatriptan.  Also discussed otc use prn for less severe migraines.

## 2023-12-18 NOTE — PROGRESS NOTES
Name: Sumaya Cassidy      : 2000      MRN: [id number]  Encounter Provider: Pattie Lemus MD  Encounter Date: 2023   Encounter department: [facility]    Assessment & Plan     1. Chronic migraine without aura without status migrainosus, not intractable  Assessment & Plan:  She felt the maxalt made her nauseous and will try sumatriptan.  Also discussed otc use prn for less severe migraines.     Orders:  -     SUMAtriptan (IMITREX) 50 mg tablet; Take 1 tablet (50 mg total) by mouth once as needed for migraine for up to 60 doses may repeat in 2 hours if necessary    2. Yeast infection  Comments:  Cultures sent, but by exam appears to have yeast vaginitis.  Will treat this to see if it makes her urinary pressure improved.  Orders:  -     fluconazole (DIFLUCAN) 150 mg tablet; Take 1 tablet (150 mg total) by mouth once for 1 dose May repeat in 3 days if ineffective.  -     NuSwab Vaginitis Plus (VG+)    3. Urinary problem  Comments:  UA negative, advised increased fluids as she is concerned about the color.    4. Need for vaccination  -     TDAP VACCINE GREATER THAN OR EQUAL TO 6YO IM        Depression Screening and Follow-up Plan: Patient was screened for depression during today's encounter. They screened negative with a PHQ-2 score of 0.        Subjective      Since late September, early October, has had vaginal itching and thought this may have been yeast infection, drank lots of cranberry and it helped initially but has gotten worse.  No discharge.  Also feels she can't empty her bladder all the way.  Feels like she has to go as soon as she finishes.  She has not been back to see her gynecologist.  Migraines are infrequent, but she gets nauseous with the maxalt and is asking for an alternative.      Female  Problem  The patient's primary symptoms include genital itching and a genital odor. The patient's pertinent negatives include no genital lesions, genital rash, missed menses,  pelvic pain, vaginal bleeding or vaginal discharge. This is a chronic problem. The current episode started more than 1 month ago. The problem occurs every several days. The problem has been waxing and waning. The patient is experiencing no pain. The problem affects both sides. She is not pregnant. Associated symptoms include constipation, frequency, headaches, joint pain and nausea. Pertinent negatives include no abdominal pain, anorexia, back pain, chills, diarrhea, discolored urine, dysuria, fever, flank pain, hematuria, joint swelling, painful intercourse, rash, sore throat, urgency or vomiting. Nothing aggravates the symptoms. She is not sexually active. No, her partner does not have an STD. She uses abstinence for contraception. Her menstrual history has been regular.   Migraine  Associated symptoms include nausea. Pertinent negatives include no abdominal pain, anorexia, back pain, diarrhea, fever, sore throat or vomiting.   Vaginal Itching  The patient's primary symptoms include genital itching and a genital odor. The patient's pertinent negatives include no genital lesions, genital rash, missed menses, pelvic pain, vaginal bleeding or vaginal discharge. Associated symptoms include constipation, frequency, headaches, joint pain and nausea. Pertinent negatives include no abdominal pain, anorexia, back pain, chills, diarrhea, discolored urine, dysuria, fever, flank pain, hematuria, joint swelling, painful intercourse, rash, sore throat, urgency or vomiting.   Urinary Frequency   Associated symptoms include frequency and nausea. Pertinent negatives include no chills, flank pain, hematuria, urgency or vomiting.     Review of Systems   Constitutional:  Negative for chills and fever.   HENT:  Negative for sore throat.    Gastrointestinal:  Positive for constipation and nausea. Negative for abdominal pain, anorexia, diarrhea and vomiting.   Genitourinary:  Positive for frequency. Negative for dysuria, flank pain,  "hematuria, missed menses, pelvic pain, urgency and vaginal discharge.   Musculoskeletal:  Positive for joint pain. Negative for back pain.   Skin:  Negative for rash.   Neurological:  Positive for headaches.       Current Outpatient Medications on File Prior to Visit   Medication Sig   • omeprazole (PriLOSEC) 40 MG capsule    • [DISCONTINUED] amitriptyline (ELAVIL) 25 mg tablet Take 1 tablet (25 mg total) by mouth daily at bedtime (Patient not taking: Reported on 12/18/2023)   • [DISCONTINUED] dicyclomine (BENTYL) 10 mg capsule Take 1 capsule (10 mg total) by mouth 4 (four) times a day (before meals and at bedtime) (Patient not taking: Reported on 12/18/2023)   • [DISCONTINUED] Erythromycin 250 MG TBEC  (Patient not taking: Reported on 5/16/2023)   • [DISCONTINUED] famotidine (PEPCID) 20 mg tablet Take 1 tablet (20 mg total) by mouth 2 (two) times a day (Patient not taking: Reported on 5/16/2023)   • [DISCONTINUED] rizatriptan (Maxalt-MLT) 10 mg disintegrating tablet Take 1 tablet (10 mg total) by mouth once as needed for migraine for up to 1 dose May repeat in 2 hours if needed (Patient not taking: Reported on 12/18/2023)       Objective     /64   Pulse 80   Temp (!) 97 °F (36.1 °C)   Resp 16   Ht 5' 5\" (1.651 m)   Wt 71.1 kg (156 lb 12.8 oz)   LMP 11/25/2023 (Approximate)   BMI 26.09 kg/m²     Physical Exam  Constitutional:       Appearance: She is well-developed.   Eyes:      Conjunctiva/sclera: Conjunctivae normal.   Neck:      Thyroid: No thyromegaly.      Vascular: No JVD.   Cardiovascular:      Rate and Rhythm: Normal rate and regular rhythm.      Heart sounds: Normal heart sounds. No murmur heard.     No friction rub. No gallop.   Pulmonary:      Effort: Pulmonary effort is normal.      Breath sounds: Normal breath sounds. No wheezing or rales.   Abdominal:      General: Bowel sounds are normal. There is no distension.      Palpations: Abdomen is soft.      Tenderness: There is no abdominal " tenderness.   Genitourinary:     Pubic Area: No rash.       Vagina: Vaginal discharge present.      Comments: Thick white adherent discharge in vaginal vault  Musculoskeletal:      Cervical back: Neck supple.       Pattie Lemus MD

## 2023-12-20 LAB
CANDIDA RRNA VAG QL PROBE: NEGATIVE
G VAGINALIS RRNA GENITAL QL PROBE: NEGATIVE
T VAGINALIS RRNA GENITAL QL PROBE: NEGATIVE

## 2024-01-17 DIAGNOSIS — K21.9 GASTROESOPHAGEAL REFLUX DISEASE WITHOUT ESOPHAGITIS: Primary | ICD-10-CM

## 2024-01-17 RX ORDER — OMEPRAZOLE 40 MG/1
CAPSULE, DELAYED RELEASE ORAL
Qty: 30 CAPSULE | Refills: 5 | Status: SHIPPED | OUTPATIENT
Start: 2024-01-17

## 2024-03-29 ENCOUNTER — ESTABLISHED COMPREHENSIVE EXAM (OUTPATIENT)
Dept: URBAN - METROPOLITAN AREA CLINIC 6 | Facility: CLINIC | Age: 24
End: 2024-03-29

## 2024-03-29 DIAGNOSIS — Z83.511: ICD-10-CM

## 2024-03-29 DIAGNOSIS — G43.109: ICD-10-CM

## 2024-03-29 PROCEDURE — 92014 COMPRE OPH EXAM EST PT 1/>: CPT

## 2024-03-29 ASSESSMENT — VISUAL ACUITY
OU_CC: J1+
OS_CC: 20/20-1
OD_CC: 20/20-2

## 2024-03-29 ASSESSMENT — TONOMETRY
OD_IOP_MMHG: 18
OS_IOP_MMHG: 19

## 2024-08-27 DIAGNOSIS — K21.9 GASTROESOPHAGEAL REFLUX DISEASE WITHOUT ESOPHAGITIS: ICD-10-CM

## 2024-08-28 RX ORDER — OMEPRAZOLE 40 MG/1
CAPSULE, DELAYED RELEASE ORAL
Qty: 30 CAPSULE | Refills: 5 | Status: SHIPPED | OUTPATIENT
Start: 2024-08-28

## 2024-09-18 ENCOUNTER — TELEPHONE (OUTPATIENT)
Dept: GASTROENTEROLOGY | Facility: CLINIC | Age: 24
End: 2024-09-18

## 2024-09-18 DIAGNOSIS — K21.9 GASTROESOPHAGEAL REFLUX DISEASE WITHOUT ESOPHAGITIS: Primary | ICD-10-CM

## 2024-09-18 RX ORDER — OMEPRAZOLE 40 MG/1
40 CAPSULE, DELAYED RELEASE ORAL DAILY
Qty: 30 CAPSULE | Refills: 5 | Status: SHIPPED | OUTPATIENT
Start: 2024-09-18 | End: 2024-10-18

## 2024-09-18 NOTE — TELEPHONE ENCOUNTER
Pt needing her Prilosec refilled but needed a fu ov.  Appt is scheduled for 2/28/2025, please send refills to the pharmacy of file.    Thank You!

## 2024-12-19 ENCOUNTER — APPOINTMENT (OUTPATIENT)
Dept: LAB | Facility: HOSPITAL | Age: 24
End: 2024-12-19
Payer: COMMERCIAL

## 2024-12-19 ENCOUNTER — ANNUAL EXAM (OUTPATIENT)
Dept: OBGYN CLINIC | Facility: CLINIC | Age: 24
End: 2024-12-19
Payer: COMMERCIAL

## 2024-12-19 VITALS
WEIGHT: 157 LBS | DIASTOLIC BLOOD PRESSURE: 70 MMHG | HEIGHT: 65 IN | BODY MASS INDEX: 26.16 KG/M2 | SYSTOLIC BLOOD PRESSURE: 110 MMHG

## 2024-12-19 DIAGNOSIS — N94.6 DYSMENORRHEA: ICD-10-CM

## 2024-12-19 DIAGNOSIS — Z01.419 WELL WOMAN EXAM WITH ROUTINE GYNECOLOGICAL EXAM: ICD-10-CM

## 2024-12-19 DIAGNOSIS — Z11.3 SCREENING EXAMINATION FOR STD (SEXUALLY TRANSMITTED DISEASE): ICD-10-CM

## 2024-12-19 DIAGNOSIS — R22.33 MASS OF BOTH AXILLAE: Primary | ICD-10-CM

## 2024-12-19 DIAGNOSIS — R22.33 MASS OF BOTH AXILLAE: ICD-10-CM

## 2024-12-19 LAB
HBV SURFACE AG SER QL: NORMAL
HCV AB SER QL: NORMAL
HIV 1+2 AB+HIV1 P24 AG SERPL QL IA: NORMAL
HIV 2 AB SERPL QL IA: NORMAL
HIV1 AB SERPL QL IA: NORMAL
HIV1 P24 AG SERPL QL IA: NORMAL
TREPONEMA PALLIDUM IGG+IGM AB [PRESENCE] IN SERUM OR PLASMA BY IMMUNOASSAY: NORMAL

## 2024-12-19 PROCEDURE — 99395 PREV VISIT EST AGE 18-39: CPT | Performed by: STUDENT IN AN ORGANIZED HEALTH CARE EDUCATION/TRAINING PROGRAM

## 2024-12-19 PROCEDURE — 86803 HEPATITIS C AB TEST: CPT

## 2024-12-19 PROCEDURE — 87340 HEPATITIS B SURFACE AG IA: CPT

## 2024-12-19 PROCEDURE — 36415 COLL VENOUS BLD VENIPUNCTURE: CPT

## 2024-12-19 PROCEDURE — 87389 HIV-1 AG W/HIV-1&-2 AB AG IA: CPT

## 2024-12-19 PROCEDURE — 86780 TREPONEMA PALLIDUM: CPT

## 2024-12-19 RX ORDER — ACETAMINOPHEN AND CODEINE PHOSPHATE 120; 12 MG/5ML; MG/5ML
1 SOLUTION ORAL DAILY
Qty: 84 TABLET | Refills: 3 | Status: SHIPPED | OUTPATIENT
Start: 2024-12-19

## 2024-12-19 NOTE — PROGRESS NOTES
"Annual GYN Exam      Subjective:   Sumaya Cassidy 24 y.o.   who presents for annual exam.     Patient's last menstrual period was 2024 (exact date).     Current periods:: regular, painful   Previously on OCPs for heavy and painful periods, improved with OCPs  Now has migraines with aura- blurry vision and speech changes, so discontinued. Now back with painful periods and muscle aches during menses   Denies vaginal irritation, abnormal vaginal discharge, and bladder complaints.     Reports she has noted \"lumps\" in b/l axilla that come and go. Does not currently have sxs.     Sexual History  Has never been active      Pertinent Medical History   Previous PAP Tests  Date of Last Pap: never    OB History    Para Term  AB Living   0 0 0 0 0 0   SAB IAB Ectopic Multiple Live Births   0 0 0 0 0     Maternal grandmother with breast cancer in 60s     Social History   Denies tobacco use   Health Maintenance and Screenings   PHQ2    Denies feeling depressed and thoughts of self harm.     Safe at home? Yes, lives with roommates in an apartment      Pap: collected      Hepatitis C: 2023    Gardasil completed    Flu recommended      Objective:   /70 (BP Location: Right arm, Patient Position: Sitting, Cuff Size: Standard)   Ht 5' 5\" (1.651 m)   Wt 71.2 kg (157 lb)   LMP 2024 (Exact Date)   BMI 26.13 kg/m²   Physical Exam  Constitutional:       General: She is not in acute distress.     Appearance: Normal appearance. She is well-developed. She is not ill-appearing.   Genitourinary:      Vulva normal.      Genitourinary Comments: Normal physiologic discharge noted   No axillary masses palpated         Right Adnexa: not tender, not full and no mass present.     Left Adnexa: not tender, not full and no mass present.     Cervical discharge present.      No cervical friability or lesion.      Uterus is not enlarged or tender.   Breasts:     Breasts are symmetrical.      Breasts are " soft.     Right: Normal. No inverted nipple, mass, nipple discharge, skin change or tenderness.      Left: Normal. No inverted nipple, mass, nipple discharge, skin change or tenderness.   HENT:      Head: Normocephalic and atraumatic.   Neck:      Thyroid: No thyromegaly or thyroid tenderness.   Cardiovascular:      Rate and Rhythm: Normal rate.   Pulmonary:      Effort: Pulmonary effort is normal. No respiratory distress.   Abdominal:      General: There is no distension.      Palpations: Abdomen is soft. There is no mass.      Tenderness: There is no abdominal tenderness. There is no guarding or rebound.   Musculoskeletal:      Cervical back: Normal range of motion and neck supple.   Lymphadenopathy:      Cervical: No cervical adenopathy.      Upper Body:      Right upper body: No supraclavicular or axillary adenopathy.      Left upper body: No supraclavicular or axillary adenopathy.   Neurological:      Mental Status: She is alert.   Psychiatric:         Attention and Perception: Attention normal.         Mood and Affect: Mood normal.   Vitals reviewed.             Assessment/Plan:   Annual Exam    Cervical cancer screening  Pap smear is indicated at this time.  Previous pap smears and pap smear guidelines have been reviewed.    Breast exam and breast cancer screening  Breast exam was done.     Health Maintenance  PHQ-2 depression screen completed.    Body mass index is 26.13 kg/m².      Problem List Items Addressed This Visit       Dysmenorrhea    Relevant Medications    norethindrone (Ortho Micronor) 0.35 MG tablet     Other Visit Diagnoses         Mass of both axillae    -  Primary    Relevant Orders    HIV 1/2 AG/AB w Reflex [facility] for 2 yr old and above    RPR-Syphilis Screening (Total Syphilis IGG/IGM)    Hepatitis C antibody    Hepatitis B surface antigen          - no current masses noted. Discussed with patient that symptoms sound like they could be folliculitis. Discussed with patient less likely  lymphadenopathy but encouraged her to represent when sxs are active for additional eval

## 2024-12-20 ENCOUNTER — RESULTS FOLLOW-UP (OUTPATIENT)
Dept: OBGYN CLINIC | Facility: CLINIC | Age: 24
End: 2024-12-20

## 2024-12-24 LAB
C TRACH RRNA CVX QL NAA+PROBE: NEGATIVE
CYTOLOGIST CVX/VAG CYTO: NORMAL
DX ICD CODE: NORMAL
Lab: NORMAL
N GONORRHOEA RRNA CVX QL NAA+PROBE: NEGATIVE
OTHER STN SPEC: NORMAL
OTHER STN SPEC: NORMAL
PATH REPORT.FINAL DX SPEC: NORMAL
SL AMB NOTE:: NORMAL
SL AMB SPECIMEN ADEQUACY: NORMAL
SL AMB TEST METHODOLOGY: NORMAL

## 2025-02-28 ENCOUNTER — OFFICE VISIT (OUTPATIENT)
Age: 25
End: 2025-02-28
Payer: COMMERCIAL

## 2025-02-28 VITALS
HEIGHT: 65 IN | BODY MASS INDEX: 26.66 KG/M2 | OXYGEN SATURATION: 100 % | HEART RATE: 86 BPM | SYSTOLIC BLOOD PRESSURE: 113 MMHG | WEIGHT: 160 LBS | DIASTOLIC BLOOD PRESSURE: 75 MMHG

## 2025-02-28 DIAGNOSIS — K21.9 GASTROESOPHAGEAL REFLUX DISEASE WITHOUT ESOPHAGITIS: ICD-10-CM

## 2025-02-28 PROCEDURE — 99213 OFFICE O/P EST LOW 20 MIN: CPT | Performed by: PHYSICIAN ASSISTANT

## 2025-02-28 RX ORDER — OMEPRAZOLE 40 MG/1
40 CAPSULE, DELAYED RELEASE ORAL DAILY
Qty: 90 CAPSULE | Refills: 3 | Status: SHIPPED | OUTPATIENT
Start: 2025-02-28 | End: 2026-02-23

## 2025-02-28 NOTE — PROGRESS NOTES
Name: Sumaya Cassidy      : 2000      MRN: [id number]  Encounter Provider: Francisco J Bansal PA-C  Encounter Date: 2025   Encounter department: [facility]  :  Assessment & Plan  Gastroesophageal reflux disease without esophagitis  Patient reports gastroparesis symptoms are generally well-controlled at this time, otherwise is taking long-term PPI therapy which has been very helpful for nocturnal episodes of acid reflux.  She reports significant symptomatology when she misses even a single dose so is unlikely to tolerate a taper at this time    -Advised patient about risks and benefits of long-term PPI therapy, in her case I would  that benefits outweigh risks    -Advised patient regarding dietary and lifestyle modification strategies for the mitigation of GERD    -Should not need to repeat EGD unless patient is experiencing significant breakthrough reflux despite medications or any other concerning upper GI symptomatology    -Follow-up in 1 year or as needed  Orders:    omeprazole (PriLOSEC) 40 MG capsule; Take 1 capsule (40 mg total) by mouth daily Take 1/2 hour prior to breakfast        History of Present Illness   Sumaya Cassidy is a 24 y.o. female with history of gastroparesis, IBS, GERD who presents for follow-up.  She says she needs renewal for omeprazole which has been very helpful for her, she takes 40 mg once nightly before bed as she said that previously she was having a lot of issues with acid reflux issues during sleep/at nighttime.  She says the medication has been very helpful, she says if she misses a single dose she generally experiences significant symptomatology promptly.  Her appetite has been stable, weight has been stable.  She says her bowel habits are generally the way they have always been, she has movement every other day or so and it is generally fairly hard, she denies any rectal bleeding however or any recent change in her habits,  she takes milk of magnesia or MiraLAX on an as-needed basis.  She said she was having some issues with bloating which to some degree are still ongoing, though observance of avoiding FODMAPs has been helpful she tells me.      She has never had colonoscopy, she had EGD in 2022, had discussions about G POEM when gastroparesis symptoms have been worse in the past, however she ultimately never ended up going through with this.  Last gastric emptying study also showed normal emptying at third and fourth hour, after initial delay in the first and second hour.  No known family history of colon cancer.  HPI    Review of Systems   Constitutional:  Negative for activity change, appetite change, chills, fatigue, fever and unexpected weight change.   HENT:  Negative for congestion, nosebleeds, rhinorrhea, sore throat and trouble swallowing.    Eyes:  Negative for pain, itching and visual disturbance.   Respiratory:  Negative for cough, shortness of breath and wheezing.    Cardiovascular:  Negative for chest pain, palpitations and leg swelling.   Gastrointestinal: Negative.    Endocrine: Negative for cold intolerance, heat intolerance and polyuria.   Genitourinary:  Negative for difficulty urinating, dysuria, flank pain and hematuria.   Musculoskeletal:  Negative for arthralgias, back pain, myalgias and neck pain.   Skin:  Negative for color change, pallor and rash.   Neurological:  Negative for dizziness, speech difficulty, weakness, numbness and headaches.   Hematological:  Does not bruise/bleed easily.   Psychiatric/Behavioral:  Negative for dysphoric mood and sleep disturbance. The patient is not nervous/anxious.    All other systems reviewed and are negative.   A complete review of systems is negative other than that noted above in the HPI.      Current Outpatient Medications   Medication Sig Dispense Refill    norethindrone (Ortho Micronor) 0.35 MG tablet Take 1 tablet (0.35 mg total) by mouth daily 84 tablet 3     "omeprazole (PriLOSEC) 40 MG capsule Take 1 capsule (40 mg total) by mouth daily Take 1/2 hour prior to breakfast 90 capsule 3    SUMAtriptan (IMITREX) 50 mg tablet Take 1 tablet (50 mg total) by mouth once as needed for migraine for up to 60 doses may repeat in 2 hours if necessary 10 tablet 5     No current facility-administered medications for this visit.     Objective   /75 (BP Location: Left arm, Patient Position: Sitting, Cuff Size: Standard)   Pulse 86   Ht 5' 5\" (1.651 m)   Wt 72.6 kg (160 lb)   SpO2 100%   BMI 26.63 kg/m²     Physical Exam  Constitutional:       General: She is not in acute distress.     Appearance: She is well-developed. She is not diaphoretic.   HENT:      Head: Normocephalic and atraumatic.   Eyes:      Conjunctiva/sclera: Conjunctivae normal.      Pupils: Pupils are equal, round, and reactive to light.   Cardiovascular:      Rate and Rhythm: Normal rate and regular rhythm.      Heart sounds: Normal heart sounds. No murmur heard.     No friction rub. No gallop.   Pulmonary:      Effort: Pulmonary effort is normal. No respiratory distress.      Breath sounds: Normal breath sounds. No stridor. No wheezing or rales.   Abdominal:      General: Bowel sounds are normal. There is no distension.      Palpations: Abdomen is soft. There is no mass.      Tenderness: There is no abdominal tenderness. There is no guarding or rebound.   Musculoskeletal:         General: No tenderness. Normal range of motion.      Cervical back: Normal range of motion and neck supple.   Skin:     General: Skin is warm and dry.      Coloration: Skin is not pale.      Findings: No erythema or rash.   Neurological:      Mental Status: She is alert and oriented to person, place, and time.   Psychiatric:         Behavior: Behavior normal.            Lab Results: I personally reviewed relevant lab results.             "

## 2025-02-28 NOTE — ASSESSMENT & PLAN NOTE
Patient reports gastroparesis symptoms are generally well-controlled at this time, otherwise is taking long-term PPI therapy which has been very helpful for nocturnal episodes of acid reflux.  She reports significant symptomatology when she misses even a single dose so is unlikely to tolerate a taper at this time    -Advised patient about risks and benefits of long-term PPI therapy, in her case I would  that benefits outweigh risks    -Advised patient regarding dietary and lifestyle modification strategies for the mitigation of GERD    -Should not need to repeat EGD unless patient is experiencing significant breakthrough reflux despite medications or any other concerning upper GI symptomatology    -Follow-up in 1 year or as needed  Orders:    omeprazole (PriLOSEC) 40 MG capsule; Take 1 capsule (40 mg total) by mouth daily Take 1/2 hour prior to breakfast

## 2025-03-11 ENCOUNTER — TELEPHONE (OUTPATIENT)
Age: 25
End: 2025-03-11

## 2025-03-11 NOTE — TELEPHONE ENCOUNTER
Patient is calling regarding Paperwork that needs to be filled out by PCP.  Sumaya is faxing in paperwork for a Scuba course that she is taking.  Patient would like to know if she is also required to come in for an appointment.    Please advise and contact patient  Thank you

## 2025-03-13 ENCOUNTER — TELEPHONE (OUTPATIENT)
Dept: FAMILY MEDICINE CLINIC | Facility: CLINIC | Age: 25
End: 2025-03-13

## 2025-03-13 NOTE — TELEPHONE ENCOUNTER
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  She needs a CPE appointment before I can complete her forms.    Form is in my folder.

## 2025-03-18 ENCOUNTER — OFFICE VISIT (OUTPATIENT)
Dept: FAMILY MEDICINE CLINIC | Facility: CLINIC | Age: 25
End: 2025-03-18
Payer: COMMERCIAL

## 2025-03-18 VITALS
WEIGHT: 164.4 LBS | DIASTOLIC BLOOD PRESSURE: 76 MMHG | BODY MASS INDEX: 26.42 KG/M2 | TEMPERATURE: 97.3 F | HEIGHT: 66 IN | SYSTOLIC BLOOD PRESSURE: 106 MMHG | HEART RATE: 84 BPM

## 2025-03-18 DIAGNOSIS — G43.709 CHRONIC MIGRAINE WITHOUT AURA WITHOUT STATUS MIGRAINOSUS, NOT INTRACTABLE: ICD-10-CM

## 2025-03-18 DIAGNOSIS — Z00.00 ROUTINE ADULT HEALTH MAINTENANCE: Primary | ICD-10-CM

## 2025-03-18 PROCEDURE — 99395 PREV VISIT EST AGE 18-39: CPT | Performed by: NURSE PRACTITIONER

## 2025-03-18 NOTE — PROGRESS NOTES
Adult Annual Physical  Name: Sumaya Cassidy      : 2000      MRN: [id number]  Encounter Provider: REYES Manjarrez  Encounter Date: 3/18/2025   Encounter department: [facility]    Assessment & Plan  Routine adult health maintenance         Chronic migraine without aura without status migrainosus, not intractable  Stable, no contraindication for scuba diving.       Preventive Screenings:  - Diabetes Screening: screening not indicated  - Cholesterol Screening: screening not indicated   - Chlamydia Screening: screening up-to-date   - Hepatitis C screening: screening up-to-date   - HIV screening: screening up-to-date   - Cervical cancer screening: screening up-to-date   - Lung cancer screening: screening not indicated   - Prostate cancer screening: screening not indicated     Immunizations:  - Immunizations due: Vaccines up to date    Counseling/Anticipatory Guidance:    - Diet: discussed recommendations for a healthy/well-balanced diet.   - Exercise: the importance of regular exercise/physical activity was discussed. Recommend exercise 3-5 times per week for at least 30 minutes.       Depression Screening and Follow-up Plan: Patient was screened for depression during today's encounter. They screened negative with a PHQ-2 score of 0.          History of Present Illness     Adult Annual Physical:  Patient presents for annual physical. Here today for CPE.  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  She was told she needed clearance given her history of migraine headache.  She does not have any other significant PMH.  Unknown triggers, no associated aura..     Diet and Physical Activity:  - Diet/Nutrition: portion control. Not sure what to put, i eat lie FODMAP  - Exercise: moderate cardiovascular exercise, vigorous cardiovascular exercise and 1-2 times a week on average.    Depression Screening:  - PHQ-2 Score: 0    General Health:  - Sleep: sleeps poorly and 4-6 hours  "of sleep on average.  - Hearing: normal hearing bilateral ears.  - Vision: most recent eye exam < 1 year ago and wears glasses.  - Dental: regular dental visits, brushes teeth once daily and floss regularly.    /GYN Health:  - Follows with GYN: yes.   - Menopause: premenopausal.   - Last menstrual cycle: 3/2/2025.   - History of STDs: no  - Contraception: barrier methods and oral contraceptives.      Advanced Care Planning:  - Has an advanced directive?: no    - Has a durable medical POA?: no      Review of Systems   Constitutional: Negative.    Respiratory: Negative.     Cardiovascular: Negative.    Gastrointestinal: Negative.    Neurological:  Positive for headaches.     Current Outpatient Medications on File Prior to Visit   Medication Sig Dispense Refill   • norethindrone (Ortho Micronor) 0.35 MG tablet Take 1 tablet (0.35 mg total) by mouth daily 84 tablet 3   • omeprazole (PriLOSEC) 40 MG capsule Take 1 capsule (40 mg total) by mouth daily Take 1/2 hour prior to breakfast 90 capsule 3   • SUMAtriptan (IMITREX) 50 mg tablet Take 1 tablet (50 mg total) by mouth once as needed for migraine for up to 60 doses may repeat in 2 hours if necessary (Patient not taking: Reported on 3/18/2025) 10 tablet 5     No current facility-administered medications on file prior to visit.      Social History     Tobacco Use   • Smoking status: Never     Passive exposure: Current   • Smokeless tobacco: Never   Vaping Use   • Vaping status: Never Used   Substance and Sexual Activity   • Alcohol use: Yes     Comment: occasionally   • Drug use: Never   • Sexual activity: Never       Objective   /76   Pulse 84   Temp (!) 97.3 °F (36.3 °C)   Ht 5' 5.5\" (1.664 m)   Wt 74.6 kg (164 lb 6.4 oz)   LMP 03/02/2025 (Approximate)   BMI 26.94 kg/m²     Physical Exam  Vitals and nursing note reviewed.   Constitutional:       General: She is not in acute distress.     Appearance: Normal appearance. She is well-developed.   HENT:      " Head: Normocephalic and atraumatic.      Right Ear: Tympanic membrane and external ear normal.      Left Ear: Tympanic membrane and external ear normal.      Nose: Nose normal.      Mouth/Throat:      Pharynx: Oropharynx is clear.   Eyes:      Extraocular Movements: Extraocular movements intact.      Conjunctiva/sclera: Conjunctivae normal.      Pupils: Pupils are equal, round, and reactive to light.   Neck:      Thyroid: No thyromegaly.      Vascular: No carotid bruit.   Cardiovascular:      Rate and Rhythm: Normal rate and regular rhythm.      Pulses: Normal pulses.      Heart sounds: Normal heart sounds. No murmur heard.  Pulmonary:      Effort: Pulmonary effort is normal.      Breath sounds: Normal breath sounds.   Abdominal:      General: Bowel sounds are normal. There is no distension.      Palpations: Abdomen is soft.      Tenderness: There is no abdominal tenderness.   Musculoskeletal:         General: No deformity. Normal range of motion.      Cervical back: Normal range of motion and neck supple.   Lymphadenopathy:      Cervical: No cervical adenopathy.   Skin:     General: Skin is warm and dry.      Capillary Refill: Capillary refill takes less than 2 seconds.   Neurological:      Mental Status: She is alert.      Sensory: No sensory deficit.      Motor: No abnormal muscle tone.      Coordination: Coordination normal.      Deep Tendon Reflexes: Reflexes normal.   Psychiatric:         Mood and Affect: Mood normal.         Behavior: Behavior normal.

## 2025-04-04 ENCOUNTER — ESTABLISHED COMPREHENSIVE EXAM (OUTPATIENT)
Dept: URBAN - METROPOLITAN AREA CLINIC 6 | Facility: CLINIC | Age: 25
End: 2025-04-04

## 2025-04-04 DIAGNOSIS — H40.013: ICD-10-CM

## 2025-04-04 DIAGNOSIS — Z83.511: ICD-10-CM

## 2025-04-04 DIAGNOSIS — G43.109: ICD-10-CM

## 2025-04-04 PROCEDURE — 92133 CPTRZD OPH DX IMG PST SGM ON: CPT

## 2025-04-04 PROCEDURE — 92083 EXTENDED VISUAL FIELD XM: CPT

## 2025-04-04 PROCEDURE — 92014 COMPRE OPH EXAM EST PT 1/>: CPT

## 2025-04-04 ASSESSMENT — TONOMETRY
OS_IOP_MMHG: 17
OD_IOP_MMHG: 19

## 2025-04-04 ASSESSMENT — VISUAL ACUITY
OD_CC: 20/20
OS_CC: 20/20

## 2025-05-13 ENCOUNTER — OFFICE VISIT (OUTPATIENT)
Dept: FAMILY MEDICINE CLINIC | Facility: CLINIC | Age: 25
End: 2025-05-13
Payer: COMMERCIAL

## 2025-05-13 VITALS
WEIGHT: 162.6 LBS | RESPIRATION RATE: 18 BRPM | HEART RATE: 76 BPM | SYSTOLIC BLOOD PRESSURE: 126 MMHG | BODY MASS INDEX: 26.13 KG/M2 | TEMPERATURE: 98.6 F | DIASTOLIC BLOOD PRESSURE: 66 MMHG | HEIGHT: 66 IN

## 2025-05-13 DIAGNOSIS — N76.0 ACUTE VAGINITIS: Primary | ICD-10-CM

## 2025-05-13 PROCEDURE — 99213 OFFICE O/P EST LOW 20 MIN: CPT | Performed by: NURSE PRACTITIONER

## 2025-05-13 NOTE — PROGRESS NOTES
Name: Sumaya Cassidy      : 2000      MRN: [id number]  Encounter Provider: REYES Manjarrez  Encounter Date: 2025   Encounter department: [facility]  :  Assessment & Plan  Acute vaginitis  Recommended cortisone externally, will f/u with results of culture once complete   Orders:  •  NuSwab Vaginitis Plus (VG+)           History of Present Illness   She has an ingrown hair on her vulva, has been present for the past week and a half. Thinks she got this from shaving. No drainage or oozing, seems to be going down in size.  Also with concern for vaginitis- thick green/yellow discharge with foul odor and itching.   Did monistat OTC, which seems to help for a couple of days, although symptoms returned.   She is not sexually active     Female  Problem  The patient's primary symptoms include genital itching, a genital odor, pelvic pain and vaginal discharge. The patient's pertinent negatives include no genital lesions, genital rash, missed menses or vaginal bleeding. This is a chronic problem. The current episode started 1 to 4 weeks ago. The problem occurs daily. The problem has been waxing and waning. The pain is mild. The problem affects both sides. She is not pregnant. Associated symptoms include abdominal pain, back pain, constipation, diarrhea, frequency, headaches and nausea. Pertinent negatives include no anorexia, chills, discolored urine, dysuria, fever, flank pain, hematuria, joint pain, joint swelling, painful intercourse, rash, sore throat, urgency or vomiting. The vaginal discharge was green, milky, thick and yellow. She has not been passing clots. She has not been passing tissue. The symptoms are aggravated by tactile pressure and urinating. She is not sexually active. No, her partner does not have an STD. She uses nothing for contraception. Her menstrual history has been regular.     Review of Systems   Constitutional:  Negative for chills and fever.   HENT:  Negative  "for sore throat.    Gastrointestinal:  Positive for abdominal pain, constipation, diarrhea and nausea. Negative for anorexia and vomiting.   Genitourinary:  Positive for frequency, pelvic pain and vaginal discharge. Negative for dysuria, flank pain, hematuria, missed menses and urgency.   Musculoskeletal:  Positive for back pain. Negative for joint pain.   Skin:  Negative for rash.   Neurological:  Positive for headaches.       Objective   /66   Pulse 76   Temp 98.6 °F (37 °C)   Resp 18   Ht 5' 5.5\" (1.664 m)   Wt 73.8 kg (162 lb 9.6 oz)   LMP 04/03/2025 (Approximate)   BMI 26.65 kg/m²      Physical Exam  Vitals and nursing note reviewed.   Constitutional:       General: She is not in acute distress.     Appearance: Normal appearance. She is well-developed. She is not diaphoretic.   Eyes:      Conjunctiva/sclera: Conjunctivae normal.   Pulmonary:      Effort: Pulmonary effort is normal. No respiratory distress.   Genitourinary:     Labia:         Right: No rash or lesion.         Left: No rash or lesion.       Vagina: Tenderness (thin/yellow) present.   Neurological:      Mental Status: She is alert.   Psychiatric:         Mood and Affect: Mood normal.         Behavior: Behavior normal.         "

## 2025-05-16 ENCOUNTER — RESULTS FOLLOW-UP (OUTPATIENT)
Dept: FAMILY MEDICINE CLINIC | Facility: CLINIC | Age: 25
End: 2025-05-16

## 2025-05-16 LAB
A VAGINAE DNA VAG QL NAA+PROBE: NORMAL SCORE
BVAB2 DNA VAG QL NAA+PROBE: NORMAL SCORE
C ALBICANS DNA VAG QL NAA+PROBE: NEGATIVE
C GLABRATA DNA VAG QL NAA+PROBE: NEGATIVE
C TRACH RRNA SPEC QL NAA+PROBE: NEGATIVE
MEGA1 DNA VAG QL NAA+PROBE: NORMAL SCORE
N GONORRHOEA RRNA SPEC QL NAA+PROBE: NEGATIVE
T VAGINALIS RRNA SPEC QL NAA+PROBE: NEGATIVE

## 2025-06-06 ENCOUNTER — OFFICE VISIT (OUTPATIENT)
Dept: OBGYN CLINIC | Facility: CLINIC | Age: 25
End: 2025-06-06

## 2025-06-06 VITALS
BODY MASS INDEX: 26.38 KG/M2 | DIASTOLIC BLOOD PRESSURE: 70 MMHG | HEART RATE: 72 BPM | SYSTOLIC BLOOD PRESSURE: 118 MMHG | WEIGHT: 161 LBS

## 2025-06-06 DIAGNOSIS — N89.8 VAGINAL IRRITATION: ICD-10-CM

## 2025-06-06 DIAGNOSIS — Z01.818 PREOP EXAMINATION: ICD-10-CM

## 2025-06-06 DIAGNOSIS — R06.02 SHORTNESS OF BREATH ON EXERTION: Primary | ICD-10-CM

## 2025-06-06 PROCEDURE — PREOP: Performed by: STUDENT IN AN ORGANIZED HEALTH CARE EDUCATION/TRAINING PROGRAM

## 2025-06-06 RX ORDER — FLUCONAZOLE 150 MG/1
150 TABLET ORAL ONCE
Qty: 1 TABLET | Refills: 0 | Status: SHIPPED | OUTPATIENT
Start: 2025-06-06 | End: 2025-06-06

## 2025-06-06 RX ORDER — CLOTRIMAZOLE AND BETAMETHASONE DIPROPIONATE 10; .64 MG/G; MG/G
CREAM TOPICAL 2 TIMES DAILY
Qty: 45 G | Refills: 0 | Status: SHIPPED | OUTPATIENT
Start: 2025-06-06

## 2025-06-06 NOTE — PROGRESS NOTES
Gynecology History & Physical    Patient Name: Sumaya Cassidy  Patient MRN:  [id number]  Date:  06/10/25   : 2000  Service: Gynecology     Subjective    Sumaya Cassidy is a 24 y.o.  female who presents for pre-op planning for the following procedure: exam under anesthesia, bilateral salpingectomy.    Is asexual and sure of her decision to never have children.     Also having vaginitis sxs since      Has hx of reflux and migraines   SOB with exertion since January, feels like she did not recover from COVID. She denies chest pain.   Smoking- denies    Surgery- denies hx of abdominal surgery    Pap - NIL,        Past Medical History[1]    Past Surgical History[2]    Allergies: Allergies[3]      Review of Systems  A 10 point review of systems was performed and was otherwise unrevealing for pertinent positives or negatives except as noted above.      Objective     /70 (BP Location: Left arm, Patient Position: Sitting, Cuff Size: Standard)   Pulse 72   Wt 73 kg (161 lb)   LMP 2025 (Within Days)   BMI 26.38 kg/m²    Physical Exam  Constitutional:       Appearance: Normal appearance. She is well-developed. She is not ill-appearing or diaphoretic.   HENT:      Head: Normocephalic and atraumatic.     Eyes:      Extraocular Movements: Extraocular movements intact.      Conjunctiva/sclera: Conjunctivae normal.       Cardiovascular:      Rate and Rhythm: Normal rate.   Pulmonary:      Effort: Pulmonary effort is normal. No respiratory distress.   Abdominal:      Palpations: Abdomen is soft.     Musculoskeletal:      Cervical back: Normal range of motion and neck supple.     Neurological:      Mental Status: She is alert and oriented to person, place, and time.      Motor: Motor function is intact.     Psychiatric:         Mood and Affect: Mood and affect normal.         Assessment & Plan     Sumaya Cassidy is a 24 y.o.  female presents for EUA, lap BS     - Patient understands  that this is a permanent procedure. She also understands the risks of BTL, such as bleeding, nerve damage, damage to bowel, bladder, ovary, uterus, ureters and other surrounding organs and tissues. Alternative, non-permanent, methods of birth contol have been offered such as, hormonal therapy, IUD. Patient declined these options and wishes to have something permanent. Will proceed with laparoscopic bilateral salpingectomy. Consents signed today and all questions answered.   - Procedure consent signed discussing all the risks, benefits and alternatives specified on the consent form. Emphasized risk of injury to surrounding stuctures   - Pre operative clearance - recommend cardiology eval due to SOB on exertion since COVID  - EKG ordered   - Antibiotics during surgery - not indicated   -  Patient will accept blood transfusions if indicated.   - Proceed to OR for above mentioned procedures .        Problem List       Acne vulgaris    Menorrhagia with regular cycle    Dysmenorrhea    Gastroparesis    Irritable bowel syndrome with both constipation and diarrhea    Chronic migraine without aura without status migrainosus, not intractable    Gastroesophageal reflux disease without esophagitis    Other constipation     Other Visit Diagnoses         Shortness of breath on exertion    -  Primary      Preop examination          Vaginal irritation                  Raquel Zafar MD   06/10/25   7:33 PM           [1]   Past Medical History:  Diagnosis Date    Allergic     Seasonal    GERD (gastroesophageal reflux disease)     Headache(784.0)     Inflammatory bowel disease     Irritable bowel syndrome     Migraine    [2]   Past Surgical History:  Procedure Laterality Date    UPPER GASTROINTESTINAL ENDOSCOPY     [3]   Allergies  Allergen Reactions    Apple - Food Allergy Swelling    Cats Claw (Uncaria Tomentosa) Eye Swelling    Other      Chemical Suncreens    Avobenzone Rash    Oxybenzone Rash

## 2025-06-06 NOTE — PATIENT INSTRUCTIONS
"PROMOTING VAGINAL AND VULVAR HEALTH      Bathing:   Use only warm water to wash the vagina.  Dry thoroughly with a clean towel.  For general washing use DOVE for sensitive skin, Dial (plain), Neutrogena, Basis, Aveeno.  We recommend using products without fragrances.  We suggest that your partner uses these soaps as well.     The vagina cleanses itself naturally in the form of normal, vaginal discharge.  Avoid using douches.  Douches can upset the natural balance of organisms.      Protectant creams:   Petroleum jelly and zinc oxide can be placed on irritated area to create a barrier to help soothe the vulvar when irritated.        Moisturizers:    There are some washes and moisturizers that reportedly decrease the risk of recurrent yeast/ BV infection.  There is limited data on these, but no harm has been reported and they may be worth trying.  They work to restore pH and promote the growth of natural vaginal stefanie.  Good Clean Love products are organic and their \"Restore\" product line is made specifically to help with this.  Luvena products have enzymes that inhibit BV overproduction.  They can be purchased  at [employer], [place], and online.       Baking soda soaks:   When irritated, soak in lukewarm (not hot) bath water with 4-5 TBSP of baking soda to help sooth vulvar itching and burning.  Soak 1 to 3 times a day for 10 minutes.  If you are doing a sitz bath, use a 1-2 tsp of baking soda.  You also can try AVEENO brand sitz baths.        Clothes:   Rinse underclothes carefully after washing or double-rinse.  Wash all new underclothes before wearing.     Use a mild soap (such as Woolite, Purex, or All Free Clear) for washing underclothes.  Use 1/3 to 1/2 the suggested amount per load.  Do not use detergents.   Do not use fabric softeners or dryer sheets in the washer or dryers.  You can use dryer balls to help soften clothes.   Stain removing products (including bleach): soak and rinse in clear water all " underwear and towels on which you have used a stain removing product.  Then wash in your regular washing cycle using ALL FREE CLEAR.  This removes as much as product as possible.  White vinegar or lemon juice (1/4 to 1/3 cup per laundry load) can be used to freshen clothing and remove oils.      Toileting/ hygiene:   Use soft white toilet tissue. Pat dry rather than wipe if irritated.   Use tampons if able (no deodorant tampons).  If use pads use only cotton liner, not nylon mesh weave that comes in contact with your skin.  We recommend Stayfree, [place], or [employer] Generation and try to avoid the Always brand.   Don't scratch.   Avoid feminine hygiene products that can irritate the vulva: feminine spray, deodorants, oils, baby wipes, greases, bubble baths, bath oils, talc, or powder.     Rinse the vulvar with lukewarm water after urination if irritated.   Do not shave or use hair removal products on the vulvar area if irritated.  You can use scissors to trim the pubic hair close to vulvar.  Laser hair removal is an option.      Lactobacilli:   There is some research to support supplementing with 5-10 billion colony-forming units of lactobacilli.  There is limited data on this and these products are not well regulated.  FemDophilus is one that is recommended.  Lactobacilli products require refrigeration for optimal potency.      Diet:    Diet that some recommend is increasing WATER, greek yogurt, cranberry juice or pills, sweat potatoes, kimchi, spinach, broccoli, salmon, garlic, bananas, fruits rich in vitamin C, lemon, avocados, and flaxseed.        Exercise:    If able, try to decrease exercise that put pressure on vulva such as bicycle riding and horseback riding.  When you exercise, use a frozen gel pack wrapped in towel if you are uncomfortable after exercise.  May want to consider avoiding highly chlorinated pools and hot tubs. Try to increase exercise that includes yoga and stretching.        Lubricants:   Many  lubricants can cause irritation.  They can change the vaginal pH and can irritate the vulvar and vagina.     Coconut or olive oil is natural.  You need to watch since these products can erode condoms and may increase the  risk of yeast infections.   Good Clean Love has organic lubricants that may be better and less irritating.   Preseed and Yes lubricants have the same osmolality of your vagina and may be less irritating  to the vagina as well.

## 2025-06-08 LAB
A VAGINAE DNA VAG QL NAA+PROBE: NORMAL SCORE
BVAB2 DNA VAG QL NAA+PROBE: NORMAL SCORE
C ALBICANS DNA VAG QL NAA+PROBE: NEGATIVE
C GLABRATA DNA VAG QL NAA+PROBE: NEGATIVE
MEGA1 DNA VAG QL NAA+PROBE: NORMAL SCORE
T VAGINALIS RRNA SPEC QL NAA+PROBE: NEGATIVE

## 2025-06-09 ENCOUNTER — RESULTS FOLLOW-UP (OUTPATIENT)
Dept: OBGYN CLINIC | Facility: CLINIC | Age: 25
End: 2025-06-09

## 2025-06-17 ENCOUNTER — OFFICE VISIT (OUTPATIENT)
Dept: CARDIOLOGY CLINIC | Facility: CLINIC | Age: 25
End: 2025-06-17
Payer: COMMERCIAL

## 2025-06-17 VITALS
SYSTOLIC BLOOD PRESSURE: 110 MMHG | BODY MASS INDEX: 26.76 KG/M2 | HEART RATE: 92 BPM | OXYGEN SATURATION: 99 % | WEIGHT: 166.5 LBS | HEIGHT: 66 IN | DIASTOLIC BLOOD PRESSURE: 70 MMHG

## 2025-06-17 DIAGNOSIS — R06.02 SHORTNESS OF BREATH ON EXERTION: Primary | ICD-10-CM

## 2025-06-17 DIAGNOSIS — Z01.818 PREOP EXAMINATION: ICD-10-CM

## 2025-06-17 PROCEDURE — 99203 OFFICE O/P NEW LOW 30 MIN: CPT | Performed by: INTERNAL MEDICINE

## 2025-06-18 ENCOUNTER — TELEPHONE (OUTPATIENT)
Dept: OBGYN CLINIC | Facility: CLINIC | Age: 25
End: 2025-06-18

## 2025-06-18 PROCEDURE — 93000 ELECTROCARDIOGRAM COMPLETE: CPT | Performed by: INTERNAL MEDICINE

## 2025-06-18 NOTE — PROGRESS NOTES
[facility]  [address] [place] [address] [address]   [place] [address]    Cardiology Consultation    Sumaya Cassidy  [id number]  2000      Consult for: shortness of breath/preoperative risk evaluation  Appreciate consult by: Pattie Lemus MD      Discussion/Summary:     Assessment & Plan  Shortness of breath on exertion  - Patient with subacute dyspnea.  The potential cardiac and noncardiac causes of dyspnea were discussed with her.  Patient has not had recent cardiac workup.  Will obtain 2D echocardiogram to rule out any structural heart disease including the presence of pulmonary hypertension, valvular heart disease or systolic/diastolic dysfunction.      - Blood work ordered to rule out anemia or other noncardiac cause.    - Chest Xray ordered  - If cardiac testing is normal, consider referral to pulmonary.     - discussed with patient in detail,  she is agreeable to proceed with above workup.   Preop examination  - Will likely be able to proceed.  Will have dyspnea workup completed prior to final approval.          HPI:     Sumaya Cassidy is a 24 y.o. here for preoperative evaluation prior to undergoing bilateral salpingectomy.  Surgical date TBD.    She has noticed shortness of breath since developing COVID infection earlier this year.  She feels dyspnea with exertion and sometimes at rest.  No LE edema, orthopnea or PND.  Symptoms had improved but then returned.  Has not changed significantly recently.   No history of similar.  Denies any chest pain.   No family history of SCD or early CAD in first degree relatives.      Past Medical History[1]  Social History[2]   Family History[3]  Past Surgical History[4]  Current Medications[5]  Allergies   Allergen Reactions    Apple - Food Allergy Swelling    Cats Claw (Uncaria Tomentosa) Eye Swelling    Other      Chemical Suncreens    Avobenzone Rash    Oxybenzone Rash       Review of Systems:   Review of Systems   Respiratory:   "Positive for shortness of breath. Negative for cough and choking.    Cardiovascular:  Negative for chest pain, palpitations and leg swelling.   All other systems reviewed and are negative.      Physical Examination:     Vitals:    06/17/25 1532   BP: 110/70   BP Location: Right arm   Patient Position: Sitting   Cuff Size: Standard   Pulse: 92   SpO2: 99%   Weight: 75.5 kg (166 lb 8 oz)   Height: 5' 5.5\" (1.664 m)       Physical Exam   Constitutional: She appears healthy. No distress.   Eyes: Pupils are equal, round, and reactive to light. Conjunctivae are normal.   Neck: No JVD present.   Cardiovascular: Normal rate, regular rhythm and normal heart sounds. Exam reveals no gallop and no friction rub.   No murmur heard.  Pulmonary/Chest: Effort normal and breath sounds normal. She has no wheezes. She has no rales.   Musculoskeletal:         General: No tenderness, deformity or edema.      Cervical back: Normal range of motion and neck supple.   Neurological: She is alert and oriented to person, place, and time.   Skin: Skin is warm and dry.        Labs:     Lab Results   Component Value Date    WBC 5.6 03/16/2023    HGB 12.7 03/16/2023    HCT 37.7 03/16/2023    MCV 89 03/16/2023    RDW 12.7 03/16/2023     03/16/2023     BMP:  Lab Results   Component Value Date    SODIUM 139 03/16/2023    K 4.1 03/16/2023     03/16/2023    CO2 19 (L) 03/16/2023    BUN 12 03/16/2023    CREATININE 0.66 03/16/2023    GLUC 83 03/16/2023    CALCIUM 8.6 04/17/2018    EGFR 129 03/16/2023     LFT:  Lab Results   Component Value Date    AST 10 03/16/2023    ALT 9 03/16/2023    ALKPHOS 109 04/17/2018    TP 6.8 03/16/2023    ALB 4.7 03/16/2023      No results found for: \"[id number]\"  No results found for: \"HGBA1C\"  Lipid Profile:   Lab Results   Component Value Date    TRIG 36 03/16/2023    HDL 52 03/16/2023       Imaging & Testing   I have personally reviewed pertinent reports.      Cardiac Testing   See above    EKG: Personally " reviewed.    Normal ECG      Dione Haynes DO, [facility]  YAJAIRA Philippe  [phone]  Please call with any questions.         [1]   Past Medical History:  Diagnosis Date    Allergic     Seasonal    GERD (gastroesophageal reflux disease)     Headache(784.0)     Inflammatory bowel disease     Irritable bowel syndrome     Migraine    [2]   Social History  Tobacco Use    Smoking status: Never     Passive exposure: Current    Smokeless tobacco: Never   Vaping Use    Vaping status: Never Used   Substance Use Topics    Alcohol use: Yes     Comment: occasionally    Drug use: Never   [3]   Family History  Problem Relation Name Age of Onset    Arthritis Mother Stacey Cassidy         Rheumatoid Arthritis    Breast cancer Maternal Grandmother Mis Serrano     Diabetes Maternal Grandmother Mis Serrano     Glaucoma Maternal Grandmother Mis Serrano     Irritable bowel syndrome Paternal Grandmother Jaylyn Cassidy     Dementia Paternal Grandmother Jaylyn Cassidy    [4]   Past Surgical History:  Procedure Laterality Date    UPPER GASTROINTESTINAL ENDOSCOPY     [5]   Current Outpatient Medications:     clotrimazole-betamethasone (LOTRISONE) 1-0.05 % cream, Apply topically 2 (two) times a day, Disp: 45 g, Rfl: 0    omeprazole (PriLOSEC) 40 MG capsule, Take 1 capsule (40 mg total) by mouth daily Take 1/2 hour prior to breakfast, Disp: 90 capsule, Rfl: 3    SUMAtriptan (IMITREX) 50 mg tablet, Take 1 tablet (50 mg total) by mouth once as needed for migraine for up to 60 doses may repeat in 2 hours if necessary, Disp: 10 tablet, Rfl: 5

## 2025-06-18 NOTE — TELEPHONE ENCOUNTER
MD Genoveva Antonio  [facility] OB GYN Department  Surgery Scheduling Sheet    Patient Name: Sumaya Cassidy  : 2000    Provider: Raquel Zafar MD     Needed: no; Language: N/A    Procedure: exam under anesthesia and bilateral lap salpingectomy    Diagnosis: desires permanent sterilization    Special Needs or Equipment: none    Anesthesia: General anesthesia    Length of stay: outpatient  Does patient have comorbid conditions that will require close perioperative monitoring prior to safe discharge: no    The patient has comorbid conditions that will require close perioperative monitoring prior to safe discharge, including N/A.  This may require acute care beyond the usual and routine recovery period. As such, inpatient admission post-operatively is expected and appropriate, and anticipated hospital length of stay will be >2 midnights.    Pre-Admission Testing Needed: yes  Labs that should be ordered: EKG ordered    Order PAT that is recommended in prep for procedure?: yes    Medical Clearance Needed: yes; Provider: cardiology for SOB    MA Form Signed (tubals/hysterectomy): Yes    Surgical Drink Given: yes    How many days out of work: 3 day(s)    How many days no drivin day(s)      Is pre op appt needed?  no  Interval for post op appt: 1 week(s)      For Surgical Scheduler:    Surgery Scheduled On:  [place]:    Pre-op Appt:  Post op Appt:  Consult/Medical clearance appt:

## 2025-06-19 ENCOUNTER — HOSPITAL ENCOUNTER (OUTPATIENT)
Dept: RADIOLOGY | Facility: HOSPITAL | Age: 25
Discharge: HOME | End: 2025-06-19
Payer: COMMERCIAL

## 2025-06-19 DIAGNOSIS — R06.02 SHORTNESS OF BREATH ON EXERTION: ICD-10-CM

## 2025-06-19 PROCEDURE — 71046 X-RAY EXAM CHEST 2 VIEWS: CPT

## 2025-06-20 ENCOUNTER — RESULTS FOLLOW-UP (OUTPATIENT)
Dept: CARDIOLOGY CLINIC | Facility: CLINIC | Age: 25
End: 2025-06-20

## 2025-06-20 LAB
ALBUMIN SERPL-MCNC: 4.6 G/DL (ref 4–5)
ALP SERPL-CCNC: 93 IU/L (ref 44–121)
ALT SERPL-CCNC: 15 IU/L (ref 0–32)
AST SERPL-CCNC: 18 IU/L (ref 0–40)
BASOPHILS # BLD AUTO: 0 X10E3/UL (ref 0–0.2)
BASOPHILS NFR BLD AUTO: 1 %
BILIRUB SERPL-MCNC: 0.7 MG/DL (ref 0–1.2)
BUN SERPL-MCNC: 12 MG/DL (ref 6–20)
BUN/CREAT SERPL: 15 (ref 9–23)
CALCIUM SERPL-MCNC: 9.1 MG/DL (ref 8.7–10.2)
CHLORIDE SERPL-SCNC: 103 MMOL/L (ref 96–106)
CHOLEST SERPL-MCNC: 145 MG/DL (ref 100–199)
CHOLEST/HDLC SERPL: 2.2 RATIO (ref 0–4.4)
CO2 SERPL-SCNC: 18 MMOL/L (ref 20–29)
CREAT SERPL-MCNC: 0.78 MG/DL (ref 0.57–1)
EGFR: 110 ML/MIN/1.73
EOSINOPHIL # BLD AUTO: 0.1 X10E3/UL (ref 0–0.4)
EOSINOPHIL NFR BLD AUTO: 1 %
ERYTHROCYTE [DISTWIDTH] IN BLOOD BY AUTOMATED COUNT: 14.1 % (ref 11.7–15.4)
GLOBULIN SER-MCNC: 2.7 G/DL (ref 1.5–4.5)
GLUCOSE SERPL-MCNC: 85 MG/DL (ref 70–99)
HCT VFR BLD AUTO: 38 % (ref 34–46.6)
HDLC SERPL-MCNC: 66 MG/DL
HGB BLD-MCNC: 11.8 G/DL (ref 11.1–15.9)
IMM GRANULOCYTES # BLD: 0 X10E3/UL (ref 0–0.1)
IMM GRANULOCYTES NFR BLD: 0 %
LDLC SERPL CALC-MCNC: 71 MG/DL (ref 0–99)
LYMPHOCYTES # BLD AUTO: 2 X10E3/UL (ref 0.7–3.1)
LYMPHOCYTES NFR BLD AUTO: 34 %
MCH RBC QN AUTO: 26.2 PG (ref 26.6–33)
MCHC RBC AUTO-ENTMCNC: 31.1 G/DL (ref 31.5–35.7)
MCV RBC AUTO: 84 FL (ref 79–97)
MONOCYTES # BLD AUTO: 0.5 X10E3/UL (ref 0.1–0.9)
MONOCYTES NFR BLD AUTO: 8 %
NEUTROPHILS # BLD AUTO: 3.3 X10E3/UL (ref 1.4–7)
NEUTROPHILS NFR BLD AUTO: 56 %
PLATELET # BLD AUTO: 367 X10E3/UL (ref 150–450)
POTASSIUM SERPL-SCNC: 4.3 MMOL/L (ref 3.5–5.2)
PROT SERPL-MCNC: 7.3 G/DL (ref 6–8.5)
RBC # BLD AUTO: 4.51 X10E6/UL (ref 3.77–5.28)
SL AMB VLDL CHOLESTEROL CALC: 8 MG/DL (ref 5–40)
SODIUM SERPL-SCNC: 138 MMOL/L (ref 134–144)
TRIGL SERPL-MCNC: 29 MG/DL (ref 0–149)
TSH SERPL DL<=0.005 MIU/L-ACNC: 1.95 UIU/ML (ref 0.45–4.5)
WBC # BLD AUTO: 5.9 X10E3/UL (ref 3.4–10.8)

## 2025-06-23 ENCOUNTER — TELEPHONE (OUTPATIENT)
Dept: CARDIOLOGY CLINIC | Facility: CLINIC | Age: 25
End: 2025-06-23

## 2025-06-23 NOTE — TELEPHONE ENCOUNTER
----- Message from Dione Haynes DO sent at 6/20/2025  4:49 PM EDT -----  Can you please let the patient know blood work was normal    ----- Message -----  From: [facility] Results In  Sent: 6/20/2025   7:35 AM EDT  To: Dione Haynes DO

## 2025-07-03 ENCOUNTER — HOSPITAL ENCOUNTER (OUTPATIENT)
Dept: NON INVASIVE DIAGNOSTICS | Facility: HOSPITAL | Age: 25
Discharge: HOME | End: 2025-07-03
Attending: INTERNAL MEDICINE
Payer: COMMERCIAL

## 2025-07-03 VITALS
HEART RATE: 92 BPM | SYSTOLIC BLOOD PRESSURE: 110 MMHG | WEIGHT: 166 LBS | HEIGHT: 65 IN | BODY MASS INDEX: 27.66 KG/M2 | DIASTOLIC BLOOD PRESSURE: 70 MMHG

## 2025-07-03 DIAGNOSIS — R06.02 SHORTNESS OF BREATH ON EXERTION: ICD-10-CM

## 2025-07-03 LAB
AORTIC ROOT: 2.5 CM
AV LVOT PEAK GRADIENT: 3 MMHG
AV PEAK GRADIENT: 5 MMHG
BSA FOR ECHO PROCEDURE: 1.83 M2
DOP CALC LVOT AREA: 2.83 CM2
DOP CALC LVOT DIAMETER: 1.9 CM
E WAVE DECELERATION TIME: 208 MS
E/A RATIO: 1.29
FRACTIONAL SHORTENING: 33 (ref 28–44)
INTERVENTRICULAR SEPTUM IN DIASTOLE (PARASTERNAL SHORT AXIS VIEW): 0.6 CM
INTERVENTRICULAR SEPTUM: 0.6 CM (ref 0.6–1.1)
LAAS-AP2: 11.5 CM2
LAAS-AP4: 16.3 CM2
LEFT ATRIUM SIZE: 3 CM
LEFT ATRIUM VOLUME (MOD BIPLANE): 46 ML
LEFT ATRIUM VOLUME INDEX (MOD BIPLANE): 25 ML/M2
LEFT INTERNAL DIMENSION IN SYSTOLE: 3 CM (ref 2.1–4)
LEFT VENTRICULAR INTERNAL DIMENSION IN DIASTOLE: 4.5 CM (ref 3.5–6)
LEFT VENTRICULAR POSTERIOR WALL IN END DIASTOLE: 0.8 CM
LEFT VENTRICULAR STROKE VOLUME: 58 ML
LV EF US.2D.A4C+ESTIMATED: 57 %
LVSV (TEICH): 58 ML
MV E'TISSUE VEL-SEP: 16 CM/S
MV PEAK A VEL: 0.76 M/S
MV PEAK E VEL: 98 CM/S
MV STENOSIS PRESSURE HALF TIME: 60 MS
MV VALVE AREA P 1/2 METHOD: 3.67
RIGHT ATRIUM AREA SYSTOLE A4C: 10.8 CM2
RIGHT VENTRICLE ID DIMENSION: 2.8 CM
SL CV LEFT ATRIUM LENGTH A2C: 3.2 CM
SL CV LV EF: 55
SL CV PED ECHO LEFT VENTRICLE DIASTOLIC VOLUME (MOD BIPLANE) 2D: 92 ML
SL CV PED ECHO LEFT VENTRICLE SYSTOLIC VOLUME (MOD BIPLANE) 2D: 35 ML
TR MAX PG: 16 MMHG
TR PEAK VELOCITY: 2 M/S
TRICUSPID ANNULAR PLANE SYSTOLIC EXCURSION: 2.1 CM
TRICUSPID VALVE PEAK REGURGITATION VELOCITY: 2.03 M/S

## 2025-07-03 PROCEDURE — 93306 TTE W/DOPPLER COMPLETE: CPT | Performed by: INTERNAL MEDICINE

## 2025-07-03 PROCEDURE — 93306 TTE W/DOPPLER COMPLETE: CPT
